# Patient Record
Sex: MALE | Race: WHITE | Employment: OTHER | URBAN - METROPOLITAN AREA
[De-identification: names, ages, dates, MRNs, and addresses within clinical notes are randomized per-mention and may not be internally consistent; named-entity substitution may affect disease eponyms.]

---

## 2022-09-22 ENCOUNTER — HOSPITAL ENCOUNTER (INPATIENT)
Age: 67
LOS: 6 days | Discharge: HOME | DRG: 869 | End: 2022-09-28
Attending: EMERGENCY MEDICINE | Admitting: INTERNAL MEDICINE
Payer: MEDICARE

## 2022-09-22 DIAGNOSIS — R06.02 SOB (SHORTNESS OF BREATH): Primary | ICD-10-CM

## 2022-09-22 DIAGNOSIS — R91.8 ABNORMAL CT LUNG SCREENING: ICD-10-CM

## 2022-09-22 LAB
ALBUMIN SERPL-MCNC: 3.4 G/DL (ref 3.4–5)
ALBUMIN/GLOB SERPL: 0.7 {RATIO} (ref 0.8–1.7)
ALP SERPL-CCNC: 113 U/L (ref 45–117)
ALT SERPL-CCNC: 28 U/L (ref 16–61)
ANION GAP SERPL CALC-SCNC: 5 MMOL/L (ref 3–18)
ARTERIAL PATENCY WRIST A: ABNORMAL
AST SERPL-CCNC: 25 U/L (ref 10–38)
BASE EXCESS BLD CALC-SCNC: 0.7 MMOL/L
BASOPHILS # BLD: 0.1 K/UL (ref 0–0.1)
BASOPHILS NFR BLD: 1 % (ref 0–2)
BDY SITE: ABNORMAL
BILIRUB SERPL-MCNC: 0.5 MG/DL (ref 0.2–1)
BUN SERPL-MCNC: 12 MG/DL (ref 7–18)
BUN/CREAT SERPL: 13 (ref 12–20)
CALCIUM SERPL-MCNC: 9.4 MG/DL (ref 8.5–10.1)
CHLORIDE SERPL-SCNC: 104 MMOL/L (ref 100–111)
CO2 SERPL-SCNC: 27 MMOL/L (ref 21–32)
CREAT SERPL-MCNC: 0.89 MG/DL (ref 0.6–1.3)
DIFFERENTIAL METHOD BLD: ABNORMAL
EOSINOPHIL # BLD: 0.6 K/UL (ref 0–0.4)
EOSINOPHIL NFR BLD: 9 % (ref 0–5)
ERYTHROCYTE [DISTWIDTH] IN BLOOD BY AUTOMATED COUNT: 14.3 % (ref 11.6–14.5)
ERYTHROCYTE [SEDIMENTATION RATE] IN BLOOD: 46 MM/HR (ref 0–20)
GAS FLOW.O2 O2 DELIVERY SYS: ABNORMAL L/MIN
GLOBULIN SER CALC-MCNC: 4.8 G/DL (ref 2–4)
GLUCOSE SERPL-MCNC: 117 MG/DL (ref 74–99)
HCO3 BLD-SCNC: 25.1 MMOL/L (ref 22–26)
HCT VFR BLD AUTO: 42.8 % (ref 36–48)
HGB BLD-MCNC: 13.5 G/DL (ref 13–16)
IMM GRANULOCYTES # BLD AUTO: 0 K/UL (ref 0–0.04)
IMM GRANULOCYTES NFR BLD AUTO: 1 % (ref 0–0.5)
INR PPP: 2.3 (ref 0.8–1.2)
LACTATE BLD-SCNC: 1.33 MMOL/L (ref 0.4–2)
LYMPHOCYTES # BLD: 1.4 K/UL (ref 0.9–3.6)
LYMPHOCYTES NFR BLD: 21 % (ref 21–52)
MCH RBC QN AUTO: 27.1 PG (ref 24–34)
MCHC RBC AUTO-ENTMCNC: 31.5 G/DL (ref 31–37)
MCV RBC AUTO: 85.8 FL (ref 78–100)
MONOCYTES # BLD: 0.5 K/UL (ref 0.05–1.2)
MONOCYTES NFR BLD: 8 % (ref 3–10)
NEUTS SEG # BLD: 4.2 K/UL (ref 1.8–8)
NEUTS SEG NFR BLD: 61 % (ref 40–73)
NRBC # BLD: 0 K/UL (ref 0–0.01)
NRBC BLD-RTO: 0 PER 100 WBC
O2/TOTAL GAS SETTING VFR VENT: 21 %
PCO2 BLD: 38.7 MMHG (ref 35–45)
PH BLD: 7.42 [PH] (ref 7.35–7.45)
PLATELET # BLD AUTO: 215 K/UL (ref 135–420)
PMV BLD AUTO: 9.7 FL (ref 9.2–11.8)
PO2 BLD: 75 MMHG (ref 80–100)
POTASSIUM SERPL-SCNC: 4.3 MMOL/L (ref 3.5–5.5)
PROT SERPL-MCNC: 8.2 G/DL (ref 6.4–8.2)
PROTHROMBIN TIME: 25.3 SEC (ref 11.5–15.2)
RBC # BLD AUTO: 4.99 M/UL (ref 4.35–5.65)
SAO2 % BLD: 95.2 % (ref 92–97)
SERVICE CMNT-IMP: ABNORMAL
SODIUM SERPL-SCNC: 136 MMOL/L (ref 136–145)
SPECIMEN TYPE: ABNORMAL
TOTAL RESP. RATE, ITRR: 18
TROPONIN-HIGH SENSITIVITY: 7 NG/L (ref 0–78)
TSH SERPL DL<=0.05 MIU/L-ACNC: 1.29 UIU/ML (ref 0.36–3.74)
WBC # BLD AUTO: 6.9 K/UL (ref 4.6–13.2)

## 2022-09-22 PROCEDURE — 82803 BLOOD GASES ANY COMBINATION: CPT

## 2022-09-22 PROCEDURE — 99285 EMERGENCY DEPT VISIT HI MDM: CPT

## 2022-09-22 PROCEDURE — 65270000029 HC RM PRIVATE

## 2022-09-22 PROCEDURE — 83605 ASSAY OF LACTIC ACID: CPT

## 2022-09-22 PROCEDURE — 85025 COMPLETE CBC W/AUTO DIFF WBC: CPT

## 2022-09-22 PROCEDURE — 84484 ASSAY OF TROPONIN QUANT: CPT

## 2022-09-22 PROCEDURE — 84443 ASSAY THYROID STIM HORMONE: CPT

## 2022-09-22 PROCEDURE — 80053 COMPREHEN METABOLIC PANEL: CPT

## 2022-09-22 PROCEDURE — 85610 PROTHROMBIN TIME: CPT

## 2022-09-22 PROCEDURE — 86140 C-REACTIVE PROTEIN: CPT

## 2022-09-22 PROCEDURE — 85652 RBC SED RATE AUTOMATED: CPT

## 2022-09-22 PROCEDURE — 87040 BLOOD CULTURE FOR BACTERIA: CPT

## 2022-09-22 PROCEDURE — 36600 WITHDRAWAL OF ARTERIAL BLOOD: CPT

## 2022-09-22 NOTE — ED PROVIDER NOTES
EMERGENCY DEPARTMENT HISTORY AND PHYSICAL EXAM    Date: 9/22/2022  Patient Name: Thomas Perkins    History of Presenting Illness     Chief Complaint   Patient presents with    Shortness of Breath       History Provided By: Patient and Dr. Jeanne Saldana      History Cleotis Valdez):   2:51 PM  Thomas Perkins is a 75 y.o. male with a PMHX of international travel to nonindustrialized nations  who presents to the emergency department (room 11) from pulmonary clinic C/O shortness of breath onset several months ago. Associated sxs include dyspnea and history of relapsing remitting fevers. Pt denies any other sxs or complaints. The patient was seen in the pulmonology clinic for shortness of breath which has been worsening over the last several months. He was last in the [facility] in March. From March through May he had relapsing and remitting fevers every couple of hours. He states he has been tested for malaria. He also had a CT 2 days ago which showed new miliary lesions bilaterally. Patient followed up with pulmonology, Dr. Jeanne Saldana today who referred the patient to the ED. Chief Complaint: Shortness of breath  Onset: Several months  Timing:  Subacute  Context:  International travel brought symptoms on, symptoms have not improved since onset  Location: Lungs  Quality:  Short of air  Severity: Moderate  Modifying Factors: Nothing makes it better, or worse. Associated Symptoms:  History of relaxing remitting fevers    PCP: Modesta Torres MD     Past History         Past Medical History:  History reviewed. No pertinent past medical history. Past Surgical History:  History reviewed. No pertinent surgical history. Family History:  History reviewed. No pertinent family history. Reviewed and non-contributory    Social History:  Social History     Tobacco Use    Smoking status: Never    Smokeless tobacco: Never       Medications:       Allergies:  No Known Allergies    Review of Systems      Review of Systems Constitutional:  Positive for fever. Negative for chills. HENT:  Negative for rhinorrhea and sore throat. Eyes:  Negative for pain and visual disturbance. Respiratory:  Positive for shortness of breath. Negative for chest tightness and wheezing. Cardiovascular:  Negative for chest pain and palpitations. Gastrointestinal:  Negative for abdominal pain, diarrhea, nausea and vomiting. Musculoskeletal:  Negative for arthralgias and myalgias. Skin:  Negative for rash and wound. Neurological:  Negative for speech difficulty, light-headedness and headaches. Psychiatric/Behavioral:  Negative for agitation and confusion. All other systems reviewed and are negative. Physical Exam     Vitals:    09/22/22 1432 09/22/22 1540 09/22/22 1633 09/22/22 2004   BP: 112/70 133/76 109/60    Pulse:  91 86    Resp:  18 20    Temp:       SpO2: 97% 96% 97% 97%   Weight: 113.4 kg (250 lb)          Physical Exam  Vitals and nursing note reviewed. Constitutional:       General: He is not in acute distress. Appearance: Normal appearance. He is normal weight. He is not ill-appearing. HENT:      Head: Normocephalic and atraumatic. Nose: Nose normal. No rhinorrhea. Mouth/Throat:      Mouth: Mucous membranes are moist.      Pharynx: No oropharyngeal exudate or posterior oropharyngeal erythema. Eyes:      Extraocular Movements: Extraocular movements intact. Conjunctiva/sclera: Conjunctivae normal.      Pupils: Pupils are equal, round, and reactive to light. Cardiovascular:      Rate and Rhythm: Normal rate and regular rhythm. Heart sounds: No murmur heard. No friction rub. No gallop. Pulmonary:      Effort: Pulmonary effort is normal. No respiratory distress. Breath sounds: Normal breath sounds. No wheezing, rhonchi or rales. Abdominal:      General: Bowel sounds are normal.      Palpations: Abdomen is soft. Tenderness: There is no abdominal tenderness.  There is no guarding or rebound. Musculoskeletal:         General: No swelling, tenderness or deformity. Normal range of motion. Cervical back: Normal range of motion and neck supple. No rigidity. Lymphadenopathy:      Cervical: No cervical adenopathy. Skin:     General: Skin is warm and dry. Findings: No rash. Neurological:      General: No focal deficit present. Mental Status: He is alert and oriented to person, place, and time. Psychiatric:         Mood and Affect: Mood normal.         Behavior: Behavior normal.       Diagnostic Study Results     Labs -  Recent Results (from the past 12 hour(s))   POC LACTIC ACID    Collection Time: 09/22/22  2:39 PM   Result Value Ref Range    Lactic Acid (POC) 1.33 0.40 - 2.00 mmol/L   CBC WITH AUTOMATED DIFF    Collection Time: 09/22/22  2:45 PM   Result Value Ref Range    WBC 6.9 4.6 - 13.2 K/uL    RBC 4.99 4.35 - 5.65 M/uL    HGB 13.5 13.0 - 16.0 g/dL    HCT 42.8 36.0 - 48.0 %    MCV 85.8 78.0 - 100.0 FL    MCH 27.1 24.0 - 34.0 PG    MCHC 31.5 31.0 - 37.0 g/dL    RDW 14.3 11.6 - 14.5 %    PLATELET 182 329 - 341 K/uL    MPV 9.7 9.2 - 11.8 FL    NRBC 0.0 0  WBC    ABSOLUTE NRBC 0.00 0.00 - 0.01 K/uL    NEUTROPHILS 61 40 - 73 %    LYMPHOCYTES 21 21 - 52 %    MONOCYTES 8 3 - 10 %    EOSINOPHILS 9 (H) 0 - 5 %    BASOPHILS 1 0 - 2 %    IMMATURE GRANULOCYTES 1 (H) 0.0 - 0.5 %    ABS. NEUTROPHILS 4.2 1.8 - 8.0 K/UL    ABS. LYMPHOCYTES 1.4 0.9 - 3.6 K/UL    ABS. MONOCYTES 0.5 0.05 - 1.2 K/UL    ABS. EOSINOPHILS 0.6 (H) 0.0 - 0.4 K/UL    ABS. BASOPHILS 0.1 0.0 - 0.1 K/UL    ABS. IMM.  GRANS. 0.0 0.00 - 0.04 K/UL    DF AUTOMATED     METABOLIC PANEL, COMPREHENSIVE    Collection Time: 09/22/22  2:45 PM   Result Value Ref Range    Sodium 136 136 - 145 mmol/L    Potassium 4.3 3.5 - 5.5 mmol/L    Chloride 104 100 - 111 mmol/L    CO2 27 21 - 32 mmol/L    Anion gap 5 3.0 - 18 mmol/L    Glucose 117 (H) 74 - 99 mg/dL    BUN 12 7.0 - 18 MG/DL    Creatinine 0.89 0.6 - 1.3 MG/DL BUN/Creatinine ratio 13 12 - 20      GFR est AA >60 >60 ml/min/1.73m2    GFR est non-AA >60 >60 ml/min/1.73m2    Calcium 9.4 8.5 - 10.1 MG/DL    Bilirubin, total 0.5 0.2 - 1.0 MG/DL    ALT (SGPT) 28 16 - 61 U/L    AST (SGOT) 25 10 - 38 U/L    Alk. phosphatase 113 45 - 117 U/L    Protein, total 8.2 6.4 - 8.2 g/dL    Albumin 3.4 3.4 - 5.0 g/dL    Globulin 4.8 (H) 2.0 - 4.0 g/dL    A-G Ratio 0.7 (L) 0.8 - 1.7     TSH 3RD GENERATION    Collection Time: 09/22/22  2:45 PM   Result Value Ref Range    TSH 1.29 0.36 - 3.74 uIU/mL   SED RATE (ESR)    Collection Time: 09/22/22  2:45 PM   Result Value Ref Range    Sed rate, automated 46 (H) 0 - 20 mm/hr   TROPONIN-HIGH SENSITIVITY    Collection Time: 09/22/22  2:45 PM   Result Value Ref Range    Troponin-High Sensitivity 7 0 - 78 ng/L   PROTHROMBIN TIME + INR    Collection Time: 09/22/22  2:45 PM   Result Value Ref Range    Prothrombin time 25.3 (H) 11.5 - 15.2 sec    INR 2.3 (H) 0.8 - 1.2     BLOOD GAS, ARTERIAL POC    Collection Time: 09/22/22  9:51 PM   Result Value Ref Range    Device: ROOM AIR      FIO2 (POC) 21 %    pH (POC) 7.42 7.35 - 7.45      pCO2 (POC) 38.7 35.0 - 45.0 MMHG    pO2 (POC) 75 (L) 80 - 100 MMHG    HCO3 (POC) 25.1 22 - 26 MMOL/L    sO2 (POC) 95.2 92 - 97 %    Base excess (POC) 0.7 mmol/L    Allens test (POC) NOT APPLICABLE      Total resp. rate 18      Site RIGHT RADIAL      Specimen type (POC) ARTERIAL      Performed by Thania Jones        Radiologic Studies -   CTA CHEST ABD PELV W CONT    (Results Pending)     CT Results  (Last 48 hours)      None          CXR Results  (Last 48 hours)      None            Medications given in the ED-  Medications   Warfarin - Pharmacy to Dose (has no administration in time range)       Procedures     Procedures    ED Course     I Lexi Lin MD) am the first provider for this patient.   I reviewed the vital signs, available nursing notes, past medical history, past surgical history, family history and social history. Records Reviewed: Nursing Notes    Cardiac Monitor:  Rate: 86 bpm  Rhythm: sinus rhythm    Pulse Oximetry Analysis - 97% on RA    2:51 PM Initial assessment performed. The patients presenting problems have been discussed, and they are in agreement with the care plan formulated and outlined with them. I have encouraged them to ask questions as they arise throughout their visit. ED Course as of 09/22/22 2309   Thu Sep 22, 2022   1737 Discussed with Dr. Jami Jain, admit to medicine. Sputum for acid-fast bacteria, QuantiFERON gold [JM]   1836 Discussed with Dr. Tarik East for medical admission. [JM]      ED Course User Index  [JM] Rebekah Lala MD         Medical Decision Making     Provider Notes (Medical Decision Making):   DDX: Tuberculosis, malaria, other tropical diseases, Q fever, dengue fever, equine encephalitis, malignancy    Discussion:  75 y.o. male with months of shortness of breath symptoms that is being investigated by pulmonology he had an abnormal CT scan 2 days ago and was following up with the pulmonology clinic today. They recommended he come to the ED for further work-up. Patient will be admitted to the hospitalist with pulmonology consult. Consideration of ID consults if there is any treatable diagnosis discovered. Patient understands and agrees with this plan. Diagnosis and Disposition     Critical Care Time: 0 minutes    Core Measures:  For Hospitalized Patients:    1. Hospitalization Decision Time:  The decision to hospitalize the patient was made by Pam Cruz MD, MD at 2:51 PM on 9/22/2022    2. Aspirin: Aspirin was not given because the patient did not present with a stroke at the time of their Emergency Department evaluation    6:36 PM Patient is being admitted to the hospital by Dr. Tarik East. The results of their tests and reasons for their admission have been discussed with them and/or available family.  They convey agreement and understanding for the need to be admitted and for their admission diagnosis. CONDITIONS ON ADMISSION:  Sepsis is not present at the time of admission. Deep Vein Thrombosis is not present at the time of admission. Thrombosis is not present at the time of admission. Urinary Tract Infection is not present at the time of admission. Pneumonia is not present at the time of admission. MRSA is not present at the time of admission. Wound infection is not present at the time of admission. Pressure Ulcer is not present at the time of admission. CLINICAL IMPRESSION:    1. SOB (shortness of breath)    2. Abnormal CT lung screening      I Yue Ribeiro MD am the primary clinician of record. Dragon Disclaimer     Please note that this dictation was completed with [employer], the computer voice recognition software. Quite often unanticipated grammatical, syntax, homophones, and other interpretive errors are inadvertently transcribed by the computer software. Please disregard these errors. Please excuse any errors that have escaped final proofreading.     Yue Ribeiro MD

## 2022-09-22 NOTE — H&P
History & Physical    Patient: Asiya Hernandez MRN: [id number]  CSN: [id number]    YOB: 1955  Age: 75 y.o. Sex: male      DOA: 9/22/2022    Chief Complaint:   Chief Complaint   Patient presents with    Shortness of Breath          HPI:     Asiya Hernandez is a 75 y.o.  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 He has been feeling ill with cough shortness of breath intermittently fevers since discharge OT [facility] in March he has undergone several rounds of antibiotics and finally no longer has fevers but notes persistent shortness of breath. Patient also required oxygen and recently was able to come off of this. Within the last few weeks. However he notes he has about 40 pound weight loss recurrent sweats dyspnea with minimal exertion. He has a loop recorder placed for cardiac work-up. He has had several GI scans some which have showed splenomegaly and most of his labs show a persistent eosinophilia. He has lost about 40 pounds unintentionally since he is traveled to [facility] none of his other companions to travel have been ill or had TB  He lives with his wife who has no respiratory symptoms. He would like to go to. Patient has a home Coumadin machine which keeps his INR right about 2.5  He has been on this for several years for PE. 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 Past Medical History:   Diagnosis Date    COVID     Hypoxemia     Pulmonary embolism ([address]        History reviewed. No pertinent surgical history. History reviewed. No pertinent family history.     Social History     Socioeconomic History    Marital status:    Tobacco Use    Smoking status: Never    Smokeless tobacco: Never       Prior to Admission medications    Medication Sig Start Date End Date Taking? Authorizing Provider   warfarin (COUMADIN) 4 mg tablet Take 4 mg by mouth every other day. Yes Provider, Historical   warfarin (COUMADIN) 5 mg tablet Take 5 mg by mouth every other day. Yes Provider, Historical       No Known Allergies      Review of Systems  GENERAL: Patient alert, awake and oriented times 3, able to communicate full sentences and not in distress. HEENT: No change in vision, no earache, tinnitus, sore throat or sinus congestion. NECK: No pain or stiffness. PULMONARY: +shortness of breath, cough or wheeze. Cardiovascular: no pnd or orthopnea, no CP  GASTROINTESTINAL: No abdominal pain, nausea, vomiting or diarrhea, melena or bright red blood per rectum. Splenomegaly  GENITOURINARY: No urinary frequency, urgency, hesitancy or dysuria. MUSCULOSKELETAL: No joint or muscle pain, no back pain, no recent trauma. DERMATOLOGIC: No rash, no itching, no lesions. Keratosis  ENDOCRINE: No polyuria, polydipsia, no heat or cold intolerance. No recent change in weight. HEMATOLOGICAL: No anemia +easy bruising or bleeding. NEUROLOGIC: No headache, seizures, numbness, tingling + weakness. Physical Exam:     Physical Exam:  Visit Vitals  /60   Pulse 86   Temp 98.7 °F (37.1 °C)   Resp 20   Wt 113.4 kg (250 lb)   SpO2 97%      O2 Device: None (Room air)    Temp (24hrs), Av.7 °F (37.1 °C), Min:98.7 °F (37.1 °C), Max:98.7 °F (37.1 °C)    No intake/output data recorded. No intake/output data recorded. General:  Alert, cooperative, no distress, appears stated age. Appears slightly dyspneic when talking              Head: Normocephalic, without obvious abnormality, atraumatic. Eyes:  Conjunctivae/corneas clear, EOMs intact. Nose: Nares normal. No drainage    Neck: Supple, symmetrical, trachea midline, no adenopathy, thyroid: no enlargement, no carotid bruit and no JVD. Lungs:   Clear to auscultation bilaterally.   Diminished breath sounds throughout cough with inspiratory inhalation occasional wheeze   Heart:  Regular rate and rhythm, S1, S2 normal.     Abdomen: Soft, non-tender. Bowel sounds normal.    Extremities: Extremities normal, atraumatic, no cyanosis or edema. Pulses: 2+ and symmetric all extremities. Skin:  No rashes or lesions   Neurologic: AAOx3, No focal motor or sensory deficit. Labs Reviewed:  Recent Results (from the past 12 hour(s))   POC LACTIC ACID    Collection Time: 09/22/22  2:39 PM   Result Value Ref Range    Lactic Acid (POC) 1.33 0.40 - 2.00 mmol/L   CBC WITH AUTOMATED DIFF    Collection Time: 09/22/22  2:45 PM   Result Value Ref Range    WBC 6.9 4.6 - 13.2 K/uL    RBC 4.99 4.35 - 5.65 M/uL    HGB 13.5 13.0 - 16.0 g/dL    HCT 42.8 36.0 - 48.0 %    MCV 85.8 78.0 - 100.0 FL    MCH 27.1 24.0 - 34.0 PG    MCHC 31.5 31.0 - 37.0 g/dL    RDW 14.3 11.6 - 14.5 %    PLATELET 105 461 - 213 K/uL    MPV 9.7 9.2 - 11.8 FL    NRBC 0.0 0  WBC    ABSOLUTE NRBC 0.00 0.00 - 0.01 K/uL    NEUTROPHILS 61 40 - 73 %    LYMPHOCYTES 21 21 - 52 %    MONOCYTES 8 3 - 10 %    EOSINOPHILS 9 (H) 0 - 5 %    BASOPHILS 1 0 - 2 %    IMMATURE GRANULOCYTES 1 (H) 0.0 - 0.5 %    ABS. NEUTROPHILS 4.2 1.8 - 8.0 K/UL    ABS. LYMPHOCYTES 1.4 0.9 - 3.6 K/UL    ABS. MONOCYTES 0.5 0.05 - 1.2 K/UL    ABS. EOSINOPHILS 0.6 (H) 0.0 - 0.4 K/UL    ABS. BASOPHILS 0.1 0.0 - 0.1 K/UL    ABS. IMM.  GRANS. 0.0 0.00 - 0.04 K/UL    DF AUTOMATED     METABOLIC PANEL, COMPREHENSIVE    Collection Time: 09/22/22  2:45 PM   Result Value Ref Range    Sodium 136 136 - 145 mmol/L    Potassium 4.3 3.5 - 5.5 mmol/L    Chloride 104 100 - 111 mmol/L    CO2 27 21 - 32 mmol/L    Anion gap 5 3.0 - 18 mmol/L    Glucose 117 (H) 74 - 99 mg/dL    BUN 12 7.0 - 18 MG/DL    Creatinine 0.89 0.6 - 1.3 MG/DL    BUN/Creatinine ratio 13 12 - 20      GFR est AA >60 >60 ml/min/1.73m2    GFR est non-AA >60 >60 ml/min/1.73m2    Calcium 9.4 8.5 - 10.1 MG/DL    Bilirubin, total 0.5 0.2 - 1.0 MG/DL    ALT (SGPT) 28 16 - 61 U/L    AST (SGOT) 25 10 - 38 U/L    Alk. phosphatase 113 45 - 117 U/L    Protein, total 8.2 6.4 - 8.2 g/dL    Albumin 3.4 3.4 - 5.0 g/dL    Globulin 4.8 (H) 2.0 - 4.0 g/dL    A-G Ratio 0.7 (L) 0.8 - 1.7     TSH 3RD GENERATION    Collection Time: 09/22/22  2:45 PM   Result Value Ref Range    TSH 1.29 0.36 - 3.74 uIU/mL   SED RATE (ESR)    Collection Time: 09/22/22  2:45 PM   Result Value Ref Range    Sed rate, automated 46 (H) 0 - 20 mm/hr   TROPONIN-HIGH SENSITIVITY    Collection Time: 09/22/22  2:45 PM   Result Value Ref Range    Troponin-High Sensitivity 7 0 - 78 ng/L   PROTHROMBIN TIME + INR    Collection Time: 09/22/22  2:45 PM   Result Value Ref Range    Prothrombin time 25.3 (H) 11.5 - 15.2 sec    INR 2.3 (H) 0.8 - 1.2     BLOOD GAS, ARTERIAL POC    Collection Time: 09/22/22  9:51 PM   Result Value Ref Range    Device: ROOM AIR      FIO2 (POC) 21 %    pH (POC) 7.42 7.35 - 7.45      pCO2 (POC) 38.7 35.0 - 45.0 MMHG    pO2 (POC) 75 (L) 80 - 100 MMHG    HCO3 (POC) 25.1 22 - 26 MMOL/L    sO2 (POC) 95.2 92 - 97 %    Base excess (POC) 0.7 mmol/L    Allens test (POC) NOT APPLICABLE      Total resp. rate 18      Site RIGHT RADIAL      Specimen type (POC) ARTERIAL      Performed by Vikash Granado         Coumadin and Protonix   and EKG    Procedures/imaging: see electronic medical records for all procedures/Xrays and details which were not copied into this note but were reviewed prior to creation of Plan      Assessment/Plan     Active Problems:  1. 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 Unintentional weight loss  3. History of PE on Coumadin  4. Eosinophilia  5. Pulmonary nodules  6.   Splenomegaly    Plan:  I called several consults  QuantiFERON gold and AFB smears x3 were ordered  Ordered several labs indicating sed rate CRP HIV hepatitis please see orders for detail  We will check an echocardiogram with bubble he already has a loop recorder  Discussed with ID hold antibiotics for now no emergent indication be done several outpatient for status  I did add albuterol inhaler I feel like he would benefit from steroids but will defer to pulmonary  I reviewed care everywhere there has been no recent CTs with contrast  I did recommend having CT with contrast looking for malignancy cirrhosis and other areas of nodules or dissemination  We will also check an ABG  We will have pharmacy dose his Coumadin    DVT/GI Prophylaxis: Lovenox and Coumadin        Gilberto Cortez MD  9/23/2022 6:35 PM

## 2022-09-22 NOTE — CONSULTS
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    CT chest 9/20/22:   1. Innumerable pulmonary nodules bilaterally highly suspicious for atypical infection to include TB. Please see discussion above. CBC w/diff, cmp reviewed  He is not hypoxic or febrile    Suggest:  Airborne isolation  AFB sputum X3-- needed, early morning sputum should be at least one of them  Order for Cryptococcal/ dengue/B.pertusisis/ Dengue fever/ HIV serology and hepatitis panel == noted    Will see him in am    No need for emperic ABX in this patient. He doesn's look septic/toxic from chart review, can process work up first.  Thanks    Bia Gomes MD  [facility])  Office #:     [phone] [phone]   Office Fax: [phone]

## 2022-09-23 ENCOUNTER — APPOINTMENT (OUTPATIENT)
Dept: CT IMAGING | Age: 67
DRG: 869 | End: 2022-09-23
Attending: INTERNAL MEDICINE
Payer: MEDICARE

## 2022-09-23 ENCOUNTER — APPOINTMENT (OUTPATIENT)
Dept: NON INVASIVE DIAGNOSTICS | Age: 67
DRG: 869 | End: 2022-09-23
Attending: INTERNAL MEDICINE
Payer: MEDICARE

## 2022-09-23 ENCOUNTER — APPOINTMENT (OUTPATIENT)
Dept: GENERAL RADIOLOGY | Age: 67
DRG: 869 | End: 2022-09-23
Attending: INTERNAL MEDICINE
Payer: MEDICARE

## 2022-09-23 PROBLEM — R59.0 HILAR ADENOPATHY: Status: ACTIVE | Noted: 2022-09-23

## 2022-09-23 PROBLEM — R05.9 COUGH: Status: ACTIVE | Noted: 2022-09-23

## 2022-09-23 LAB
ALBUMIN SERPL-MCNC: 3.1 G/DL (ref 3.4–5)
ALBUMIN/GLOB SERPL: 0.7 {RATIO} (ref 0.8–1.7)
ALP SERPL-CCNC: 102 U/L (ref 45–117)
ALT SERPL-CCNC: 26 U/L (ref 16–61)
ANION GAP SERPL CALC-SCNC: 2 MMOL/L (ref 3–18)
AST SERPL-CCNC: 19 U/L (ref 10–38)
BASOPHILS # BLD: 0 K/UL (ref 0–0.1)
BASOPHILS NFR BLD: 1 % (ref 0–2)
BILIRUB SERPL-MCNC: 0.5 MG/DL (ref 0.2–1)
BUN SERPL-MCNC: 13 MG/DL (ref 7–18)
BUN/CREAT SERPL: 14 (ref 12–20)
CALCIUM SERPL-MCNC: 9.4 MG/DL (ref 8.5–10.1)
CHLORIDE SERPL-SCNC: 104 MMOL/L (ref 100–111)
CO2 SERPL-SCNC: 30 MMOL/L (ref 21–32)
CREAT SERPL-MCNC: 0.9 MG/DL (ref 0.6–1.3)
CRP SERPL-MCNC: 5.2 MG/DL (ref 0–0.3)
CRP SERPL-MCNC: 5.7 MG/DL (ref 0–0.3)
CRYPTOC AG SER QL IA: NEGATIVE
DIFFERENTIAL METHOD BLD: ABNORMAL
ECHO AO ROOT DIAM: 3.2 CM
ECHO AO ROOT INDEX: 1.44 CM/M2
ECHO AV AREA PEAK VELOCITY: 2.1 CM2
ECHO AV AREA VTI: 2.4 CM2
ECHO AV AREA/BSA PEAK VELOCITY: 0.9 CM2/M2
ECHO AV AREA/BSA VTI: 1.1 CM2/M2
ECHO AV MEAN GRADIENT: 7 MMHG
ECHO AV MEAN VELOCITY: 1.3 M/S
ECHO AV PEAK GRADIENT: 14 MMHG
ECHO AV PEAK VELOCITY: 1.9 M/S
ECHO AV VELOCITY RATIO: 0.47
ECHO AV VTI: 34.4 CM
ECHO EST RA PRESSURE: 3 MMHG
ECHO LA DIAMETER INDEX: 1.53 CM/M2
ECHO LA DIAMETER: 3.4 CM
ECHO LA TO AORTIC ROOT RATIO: 1.06
ECHO LA VOL 2C: 33 ML (ref 18–58)
ECHO LA VOL 4C: 36 ML (ref 18–58)
ECHO LA VOL BP: 36 ML (ref 18–58)
ECHO LA VOL/BSA BIPLANE: 16 ML/M2 (ref 16–34)
ECHO LA VOLUME AREA LENGTH: 39 ML
ECHO LA VOLUME INDEX A2C: 15 ML/M2 (ref 16–34)
ECHO LA VOLUME INDEX A4C: 16 ML/M2 (ref 16–34)
ECHO LA VOLUME INDEX AREA LENGTH: 18 ML/M2 (ref 16–34)
ECHO LV E' LATERAL VELOCITY: 10 CM/S
ECHO LV E' SEPTAL VELOCITY: 7 CM/S
ECHO LV EDV A2C: 43 ML
ECHO LV EDV A4C: 78 ML
ECHO LV EDV BP: 58 ML (ref 67–155)
ECHO LV EDV INDEX A4C: 35 ML/M2
ECHO LV EDV INDEX BP: 26 ML/M2
ECHO LV EDV NDEX A2C: 19 ML/M2
ECHO LV EJECTION FRACTION A2C: 59 %
ECHO LV EJECTION FRACTION A4C: 56 %
ECHO LV EJECTION FRACTION BIPLANE: 56 % (ref 55–100)
ECHO LV ESV A2C: 18 ML
ECHO LV ESV A4C: 34 ML
ECHO LV ESV BP: 25 ML (ref 22–58)
ECHO LV ESV INDEX A2C: 8 ML/M2
ECHO LV ESV INDEX A4C: 15 ML/M2
ECHO LV ESV INDEX BP: 11 ML/M2
ECHO LV FRACTIONAL SHORTENING: 33 % (ref 28–44)
ECHO LV GLOBAL LONGITUDINAL STRAIN (GLS): -10.9 %
ECHO LV GLOBAL LONGITUDINAL STRAIN (GLS): -12.3 %
ECHO LV GLOBAL LONGITUDINAL STRAIN (GLS): -13.1 %
ECHO LV GLOBAL LONGITUDINAL STRAIN (GLS): -15.9 %
ECHO LV INTERNAL DIMENSION DIASTOLE INDEX: 1.8 CM/M2
ECHO LV INTERNAL DIMENSION DIASTOLIC: 4 CM (ref 4.2–5.9)
ECHO LV INTERNAL DIMENSION SYSTOLIC INDEX: 1.22 CM/M2
ECHO LV INTERNAL DIMENSION SYSTOLIC: 2.7 CM
ECHO LV IVSD: 1.3 CM (ref 0.6–1)
ECHO LV MASS 2D: 145.6 G (ref 88–224)
ECHO LV MASS INDEX 2D: 65.6 G/M2 (ref 49–115)
ECHO LV POSTERIOR WALL DIASTOLIC: 0.9 CM (ref 0.6–1)
ECHO LV RELATIVE WALL THICKNESS RATIO: 0.45
ECHO LVOT AREA: 4.5 CM2
ECHO LVOT AV VTI INDEX: 0.53
ECHO LVOT DIAM: 2.4 CM
ECHO LVOT MEAN GRADIENT: 2 MMHG
ECHO LVOT PEAK GRADIENT: 3 MMHG
ECHO LVOT PEAK VELOCITY: 0.9 M/S
ECHO LVOT STROKE VOLUME INDEX: 36.9 ML/M2
ECHO LVOT SV: 81.8 ML
ECHO LVOT VTI: 18.1 CM
ECHO MV A VELOCITY: 0.65 M/S
ECHO MV E VELOCITY: 0.57 M/S
ECHO MV E/A RATIO: 0.88
ECHO MV E/E' LATERAL: 5.7
ECHO MV E/E' RATIO (AVERAGED): 6.92
ECHO MV E/E' SEPTAL: 8.14
ECHO PULMONARY ARTERY SYSTOLIC PRESSURE (PASP): 26 MMHG
ECHO PV ACCELERATION TIME (AT): 62.8 MS
ECHO PV MAX VELOCITY: 1.1 M/S
ECHO PV MEAN GRADIENT: 2 MMHG
ECHO PV MEAN VELOCITY: 0.7 M/S
ECHO PV PEAK GRADIENT: 5 MMHG
ECHO RIGHT VENTRICULAR SYSTOLIC PRESSURE (RVSP): 26 MMHG
ECHO RV FREE WALL PEAK S': 12 CM/S
ECHO RV INTERNAL DIMENSION: 3.2 CM
ECHO TV REGURGITANT MAX VELOCITY: 2.38 M/S
ECHO TV REGURGITANT PEAK GRADIENT: 23 MMHG
EOSINOPHIL # BLD: 0.7 K/UL (ref 0–0.4)
EOSINOPHIL NFR BLD: 12 % (ref 0–5)
ERYTHROCYTE [DISTWIDTH] IN BLOOD BY AUTOMATED COUNT: 14.4 % (ref 11.6–14.5)
ERYTHROCYTE [SEDIMENTATION RATE] IN BLOOD: 61 MM/HR (ref 0–20)
GLOBULIN SER CALC-MCNC: 4.7 G/DL (ref 2–4)
GLUCOSE SERPL-MCNC: 103 MG/DL (ref 74–99)
HAV IGM SER QL: NEGATIVE
HBV CORE IGM SER QL: NEGATIVE
HBV SURFACE AG SER QL: 0.58 INDEX
HBV SURFACE AG SER QL: NEGATIVE
HCT VFR BLD AUTO: 41.2 % (ref 36–48)
HCV AB SER IA-ACNC: 0.08 INDEX
HCV AB SERPL QL IA: NEGATIVE
HCV COMMENT,HCGAC: NORMAL
HGB BLD-MCNC: 12.6 G/DL (ref 13–16)
HIV 1+2 AB+HIV1 P24 AG SERPL QL IA: NONREACTIVE
HIV12 RESULT COMMENT, HHIVC: NORMAL
IMM GRANULOCYTES # BLD AUTO: 0 K/UL (ref 0–0.04)
IMM GRANULOCYTES NFR BLD AUTO: 1 % (ref 0–0.5)
INR PPP: 2.5 (ref 0.8–1.2)
INR PPP: 2.6 (ref 0.8–1.2)
LYMPHOCYTES # BLD: 1.3 K/UL (ref 0.9–3.6)
LYMPHOCYTES NFR BLD: 22 % (ref 21–52)
MAGNESIUM SERPL-MCNC: 2 MG/DL (ref 1.6–2.6)
MCH RBC QN AUTO: 26.8 PG (ref 24–34)
MCHC RBC AUTO-ENTMCNC: 30.6 G/DL (ref 31–37)
MCV RBC AUTO: 87.7 FL (ref 78–100)
MONOCYTES # BLD: 0.4 K/UL (ref 0.05–1.2)
MONOCYTES NFR BLD: 7 % (ref 3–10)
NEUTS SEG # BLD: 3.5 K/UL (ref 1.8–8)
NEUTS SEG NFR BLD: 58 % (ref 40–73)
NRBC # BLD: 0 K/UL (ref 0–0.01)
NRBC BLD-RTO: 0 PER 100 WBC
PLATELET # BLD AUTO: 186 K/UL (ref 135–420)
PMV BLD AUTO: 9.8 FL (ref 9.2–11.8)
POTASSIUM SERPL-SCNC: 4.5 MMOL/L (ref 3.5–5.5)
PROT SERPL-MCNC: 7.8 G/DL (ref 6.4–8.2)
PROTHROMBIN TIME: 27.6 SEC (ref 11.5–15.2)
PROTHROMBIN TIME: 28.2 SEC (ref 11.5–15.2)
RBC # BLD AUTO: 4.7 M/UL (ref 4.35–5.65)
SODIUM SERPL-SCNC: 136 MMOL/L (ref 136–145)
SP1: NORMAL
SP2: NORMAL
SP3: NORMAL
TROPONIN-HIGH SENSITIVITY: 8 NG/L (ref 0–78)
WBC # BLD AUTO: 6 K/UL (ref 4.6–13.2)

## 2022-09-23 PROCEDURE — 36415 COLL VENOUS BLD VENIPUNCTURE: CPT

## 2022-09-23 PROCEDURE — 87207 SMEAR SPECIAL STAIN: CPT

## 2022-09-23 PROCEDURE — 86790 VIRUS ANTIBODY NOS: CPT

## 2022-09-23 PROCEDURE — 86140 C-REACTIVE PROTEIN: CPT

## 2022-09-23 PROCEDURE — 71275 CT ANGIOGRAPHY CHEST: CPT

## 2022-09-23 PROCEDURE — 86638 Q FEVER ANTIBODY: CPT

## 2022-09-23 PROCEDURE — 74011000636 HC RX REV CODE- 636: Performed by: INTERNAL MEDICINE

## 2022-09-23 PROCEDURE — 85610 PROTHROMBIN TIME: CPT

## 2022-09-23 PROCEDURE — 65270000029 HC RM PRIVATE

## 2022-09-23 PROCEDURE — 85025 COMPLETE CBC W/AUTO DIFF WBC: CPT

## 2022-09-23 PROCEDURE — 87327 CRYPTOCOCCUS NEOFORM AG IA: CPT

## 2022-09-23 PROCEDURE — 86480 TB TEST CELL IMMUN MEASURE: CPT

## 2022-09-23 PROCEDURE — 93306 TTE W/DOPPLER COMPLETE: CPT

## 2022-09-23 PROCEDURE — 84484 ASSAY OF TROPONIN QUANT: CPT

## 2022-09-23 PROCEDURE — 83735 ASSAY OF MAGNESIUM: CPT

## 2022-09-23 PROCEDURE — 71045 X-RAY EXAM CHEST 1 VIEW: CPT

## 2022-09-23 PROCEDURE — 86615 BORDETELLA ANTIBODY: CPT

## 2022-09-23 PROCEDURE — 74011250637 HC RX REV CODE- 250/637: Performed by: INTERNAL MEDICINE

## 2022-09-23 PROCEDURE — 85652 RBC SED RATE AUTOMATED: CPT

## 2022-09-23 PROCEDURE — 87389 HIV-1 AG W/HIV-1&-2 AB AG IA: CPT

## 2022-09-23 PROCEDURE — 80053 COMPREHEN METABOLIC PANEL: CPT

## 2022-09-23 PROCEDURE — 87116 MYCOBACTERIA CULTURE: CPT

## 2022-09-23 PROCEDURE — 83036 HEMOGLOBIN GLYCOSYLATED A1C: CPT

## 2022-09-23 PROCEDURE — 80074 ACUTE HEPATITIS PANEL: CPT

## 2022-09-23 RX ORDER — ALBUTEROL SULFATE 90 UG/1
2 AEROSOL, METERED RESPIRATORY (INHALATION)
Status: DISCONTINUED | OUTPATIENT
Start: 2022-09-23 | End: 2022-09-28 | Stop reason: HOSPADM

## 2022-09-23 RX ORDER — WARFARIN 4 MG/1
4 TABLET ORAL EVERY OTHER DAY
COMMUNITY

## 2022-09-23 RX ORDER — ALBUTEROL SULFATE 90 UG/1
2 AEROSOL, METERED RESPIRATORY (INHALATION) EVERY 4 HOURS
Status: DISCONTINUED | OUTPATIENT
Start: 2022-09-23 | End: 2022-09-28 | Stop reason: HOSPADM

## 2022-09-23 RX ORDER — WARFARIN SODIUM 5 MG/1
5 TABLET ORAL EVERY OTHER DAY
COMMUNITY

## 2022-09-23 RX ADMIN — IOPAMIDOL 100 ML: 755 INJECTION, SOLUTION INTRAVENOUS at 05:36

## 2022-09-23 RX ADMIN — WARFARIN SODIUM 4 MG: 3 TABLET ORAL at 17:08

## 2022-09-23 RX ADMIN — ALBUTEROL SULFATE 2 PUFF: 108 INHALANT RESPIRATORY (INHALATION) at 22:12

## 2022-09-23 RX ADMIN — ALBUTEROL SULFATE 2 PUFF: 108 INHALANT RESPIRATORY (INHALATION) at 17:08

## 2022-09-23 NOTE — PROGRESS NOTES
[address] [facility] care of pt at this time. Assessment complete. Pt alert and oriented x 4. Shows no sign of distress. Fall risk arm band in place. Denies SOB and chest pain. Pt lungs clear bilaterally on room air. Cap refill  less than 3 seconds. Pt denies numbness and tingling to all extremities. Stated pain 0/10. Pt has 20 G IV to R forearm. Pt has no dressing . Skin intact. On warfarin for chronic PE hx. Incentive spirometer at bedside. Pt encouraged to continue use of IS. Pt verbalized understanding. Call light and possessions within reach. Bed locked and in low position. Will continue to monitor. Shift summary  Pt is alert and oriented x 4. Pt had uneventful shift. Pt ambulating and voiding sufficient amounts . Pain to be controlled by PRN medication. Pt on contact for rule out of TB, Covid 19. Also pending labs and cultures.

## 2022-09-23 NOTE — PROGRESS NOTES
Reason for Admission:  Shortness of breath                     RUR Score:   Low; 6%                  Plan for utilizing home health:   Unlikely        PCP: First and Last name:  Donna Lujan MD     Name of Practice:    Are you a current patient: Yes/No:    Approximate date of last visit:    Can you participate in a virtual visit with your PCP:                     Current Advanced Directive/Advance Care Plan: Full Code      Healthcare Decision Maker:   Click here to complete [address] including selection of the Healthcare Decision Maker Relationship (ie \"Primary\")                             Transition of Care Plan:     Home with physician follow up                  Chart reviewed. Per H&P \"Sean Bhandari is a 75 y.o.  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 He has been feeling ill with cough shortness of breath intermittently fevers since discharge OT [facility] in March he has undergone several rounds of antibiotics and finally no longer has fevers but notes persistent shortness of breath. Patient also required oxygen and recently was able to come off of this. Within the last few weeks. However he notes he has about 40 pound weight loss recurrent sweats dyspnea with minimal exertion. He has a loop recorder placed for cardiac work-up. He has had several GI scans some which have showed splenomegaly and most of his labs show a persistent eosinophilia. He has lost about 40 pounds unintentionally since he is traveled to [facility] none of his other companions to travel have been ill or had TB  He lives with his wife who has no respiratory symptoms. He would like to go to. Patient has a home Coumadin machine which keeps his INR right about 2.5  He has been on this for several years for PE.   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 \"    CM spoke with pt to discuss care transition. Pt is  and independent. Pt does not have or use DME. Anticipate pt will transition home with physician follow up when medically stable. CM to continue to follow and assist as needed. Care Management Interventions  Mode of Transport at Discharge:  Other (see comment) (Family)  Transition of Care Consult (CM Consult): Discharge Planning  Health Maintenance Reviewed: Yes  Support Systems: Spouse/Significant Other  Confirm Follow Up Transport: Self  The Plan for Transition of Care is Related to the Following Treatment Goals : Home with physician follow up  Discharge Location  Patient Expects to be Discharged to[de-identified] Home with family assistance

## 2022-09-23 NOTE — ROUTINE PROCESS
Bedside and Verbal shift change report given to Herlinda Erwin RN (oncoming nurse) by Sienna Calix RN (offgoing nurse). Report included the following information SBAR, Kardex, MAR and Recent Results.

## 2022-09-23 NOTE — CONSULTS
[facility]  (A Division of [address])    Madison Lake MD Maegan  Office #: - Option # [phone]  Fax #: [phone]     Date of Admission: 9/22/2022      Date of Note: 9/23/2022     Reason for Referral: Evaluation and antibiotic management of BL      Thank you for involving me in the care of this patient. Please do not hesitate to contact me on the above number if question or concern. 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   --Numerous bilateral pulmonary nodules which are likely infectious or inflammatory in etiology on CT chest done 9/20/22( no mediastinal/ hilar/axillar LAP noted). Lesions have progressed since last CT 5/20/22 . High on  DDX would be Pul TB/ Miliary TB- other fungal chronic infections Vs non  infectiout etiology such as Connetive tissue ds or maliagnancy possible.   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    Recommendation -- ID related:     Interesting case-- REESE initiated for etiology  CXR --portable ordered  AFB X3, Quantiferon TB, HIV, hepatitis-pending  Infectious REESE for cryptococcal/ Q fever/ Dengue fever/ B.pertussis/Parasaite exam--sent from admission  Check cortisol level/ TSH/HB A1C for baseline/ Immunoglobulin levels  Serology for connective tissue ds- will defer to PCCM  If AFB stain X3 is negative, recommend bronchoscopy for sample- for additional work up  [facility] Department-- to facilitate early identification of RR by PCR-- they can come and get his sputums on Monday to send for [facility]--will leave instruction with patient. Will hold off Abx at this time-- call over weekend if his clinical status change. DW Dr Bijal Bernal, Dr Iram Bond and [place], and [address] [facility]. HPI:     NB: Wife was on face time for most of the interview    Shirin Bar is a 75 y.o. WHITE/NON- with PMH of obesity/ DVT/PE on Coumadin X13 years. History of prior TB exposure test negative so far. History of COVID 19 infection x2-- with travel history mostly to [place]-- last 2 years. 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 He became sick post booster dose for COVIID 19 in April 2022 per patient. He is not sure if he had infection around that time, but he was quite sick with fever to 104 , drenching sweats and later required oxygen supplement upto 5L that he was slowly weaned off- at that time, he was treated with Z-tres ans Amoxacillin as well. After that, he has persistent cough with phlegm at times. Occasional night sweats, poor appetite and weight loss X50 lb since then. At this time, he still feels easily fatigued. Denies family hx of auto-immune or connective tissue dse. Denies recurrent history of PNA/cellulitis. Sinusitis episode once every winter. Doesn't smoke, no etoh use, no drug use. Chart reviewed on prior notes  Care Everywhere reviewed- CT chest report as above  Imaging repots reviewed      Shortness of breath [R06.02]  History reviewed. No pertinent surgical history. History reviewed. No pertinent family history.   Medications reviewed as below:   Current Facility-Administered Medications   Medication Dose Route Frequency Provider Last Rate Last Admin    albuterol (PROVENTIL HFA, VENTOLIN HFA, PROAIR HFA) inhaler 2 Puff  2 Puff Inhalation Q4H PRN Marquez Torres MD        warfarin (COUMADIN) tablet 4 mg  4 mg Oral ONCE Juan Miguel Torres President MD GORDON        Warfarin - Pharmacy to Dose  1 Each Other Rx Dosing/Monitoring Diego Torres MD         No Known Allergies  Social History     Socioeconomic History    Marital status:      Spouse name: Not on file    Number of children: Not on file    Years of education: Not on file    Highest education level: Not on file   Occupational History    Not on file   Tobacco Use    Smoking status: Never    Smokeless tobacco: Never   Substance and Sexual Activity    Alcohol use: Not on file    Drug use: Not on file    Sexual activity: Not on file   Other Topics Concern    Not on file   Social History Narrative    Not on file     Social Determinants of Health     Financial Resource Strain: Not on file   Food Insecurity: Not on file   Transportation Needs: Not on file   Physical Activity: Not on file   Stress: Not on file   Social Connections: Not on file   Intimate Partner Violence: Not on file   Housing Stability: Not on file        Review of Systems    Negative Unless BOLDED    General: fevers, chills, myalgias, arthralgias, weight loss, malaise, fatigue. HEENT:  headaches,sinus pain or presure, recent URI, recent dental procedures;  tinnitus, hearing loss , visual changes, catarats, dizziness or blurred vision  PUlMONARY:  cough , shortness of breath, sputum production, hx of asthma or COPD. previous treatement for TB or PPD. Cardiovascular: chest pain, previous CAD/MI, vavlular heart disease,  murmurs  GI:   nausea, vomiting, diarrhea, abdominal pain, prior C.diff  :  urinary frequency, dysuria, hematuria, bladder incontinence.    Neurologic:  seizures, syncope or prior CVA/TIA, confusion, memory impairment, neuropathy  Musculoskeletal:  myalgias arthralgias, joint pain/ swelling,  back pain  Skin:  Purities, cellulitis,  chronic stasis ulcer, diabetic foot ulcers  Endocrine: polyuria, polydipsia, hair loss, weight gain  Psych: Denies depression or treatment by a psychiatrist/psycologist  Heme-Onc: prior DVT, easy bruising, fatigue, malignancy        Objective:      Visit Vitals  /73   Pulse 78   Temp 97.6 °F (36.4 °C)   Resp 18   Ht 5' 7\" (1.702 m)   Wt 113.4 kg (250 lb)   SpO2 96%   BMI 39.16 kg/m²     Temp (24hrs), Av.4 °F (36.9 °C), Min:97.6 °F (36.4 °C), Max:98.7 °F (37.1 °C)      Lines / Catheters:   PIV    General:   awake alert and oriented- pleasant affect   Skin:   no rashes or skin lesions noted on limited exam  Pigmented skin- left leg from DVT   HEENT:  Normocephalic, atraumatic,no scleral icterus or pallor; no conjunctival hemmohage; No thrush. Dentition good. Neck supple, no bruits. Lymph Nodes:   no cervical, axillary or inguinal adenopathy   Lungs:   non-labored, bilaterally clear to ausculation- no crackles wheezes rales or rhonchi   Heart:  RRR, s1 and s2; no murmurs rubs or gallops, no edema, + pedal pulses   Abdomen:  soft, non-distended, active bowel sounds. Appropriate surgical scars for stated surgeries. Non-tender   Genitourinary:  deferred   Extremities:   no clubbing, cyanosis; no joint effusions or swelling;  muscle mass appropriate for age   Neurologic:  No gross focal sensory abnormalities; 5/5 muscle strength to upper and lower extremities. Speech appropirate. Cranial nerves intact   Psychiatric:   appropriate and interactive.      Results       Procedure Component Value Units Date/Time    AFB CULTURE + SMEAR W/RFLX ID FROM CULTURE [[id number]]     Order Status: Sent     CRYPTOCOCCUS AG W/REFLEX TITER [[id number]] Collected: 22    Order Status: No result Updated: 22    AFB CULTURE + SMEAR W/RFLX ID FROM CULTURE [[id number]] Collected: 22    Order Status: Canceled     AFB CULTURE + SMEAR W/RFLX ID FROM CULTURE [[id number]] Collected: 2215    Order Status: Sent Updated: 22    AFB CULTURE + SMEAR W/RFLX ID FROM CULTURE [[id number]]     Order Status: Canceled     AFB CULTURE + SMEAR W/RFLX ID FROM CULTURE [[id number]]     Order Status: Canceled     RESPIRATORY VIRUS PANEL W/COVID-19, PCR [[id number]]     Order Status: Sent Specimen: NASOPHARYNGEAL SWAB     OVA & PARASITES, STOOL [[id number]]     Order Status: Canceled Specimen: Feces from Stool     ANEESH Plasencia8 [[id number]] Collected: 09/22/22 2000    Order Status: Canceled Specimen: Serum from Blood     ACID FAST SMEAR [[id number]]     Order Status: Sent     CULTURE, BLOOD [[id number]] Collected: 09/22/22 1445    Order Status: Completed Specimen: Blood Updated: 09/23/22 0832     Special Requests: NO SPECIAL REQUESTS        Culture result: NO GROWTH AFTER 17 HOURS       CULTURE, BLOOD 2nd DRAW (required for [facility]) [[id number]] Collected: 09/22/22 1445    Order Status: Completed Specimen: Blood Updated: 09/23/22 0832     Special Requests: NO SPECIAL REQUESTS        Culture result: NO GROWTH AFTER 17 HOURS               Labs: Results:   Chemistry Recent Labs     09/23/22  0147 09/22/22  1445   * 117*    136   K 4.5 4.3    104   CO2 30 27   BUN 13 12   CREA 0.90 0.89   CA 9.4 9.4   AGAP 2* 5   BUCR 14 13    113   TP 7.8 8.2   ALB 3.1* 3.4   GLOB 4.7* 4.8*   AGRAT 0.7* 0.7*      CBC w/Diff Recent Labs     09/23/22  0147 09/22/22  1445   WBC 6.0 6.9   RBC 4.70 4.99   HGB 12.6* 13.5   HCT 41.2 42.8    215   GRANS 58 61   LYMPH 22 21   EOS 12* 9*            Imaging: All imaging reviewed from Admission to present as per radiology interpretation in [address] [facility]     Total duration of time spent with patient interview and exam and discussion of plan of care, review of chart in care everywhere, discussion with medical staff- nursing/attending and additional specialities as indicated:   90 minutes.

## 2022-09-23 NOTE — PROGRESS NOTES
Assumed Pt care at AM change of shift. Assessment details in EMR. Ordered labs and cultures obtained. Gave report to Darek Faustin.

## 2022-09-23 NOTE — PROGRESS NOTES
Pharmacy Dosing Services: Warfarin Dosing    Consult for Warfarin Dosing by Pharmacy by Dr. Tessa Ayala provided for this 75 y.o.  male , for indication of Pulmonary Embolism. Day of Therapy - home med  Dose to achieve an INR goal of 2-3    Order entered for Warfarin 4 mg to be given today at 18:00. Significant drug interactions: none  PT/INR Lab Results   Component Value Date/Time    INR 2.5 (H) 09/23/2022 01:47 AM      Platelets Lab Results   Component Value Date/Time    PLATELET 545 30/35/7174 01:47 AM      H/H     Lab Results   Component Value Date/Time    HGB 12.6 (L) 09/23/2022 01:47 AM        Warfarin Administrations (last 168 hours)       None            Pharmacy to follow daily and will provide subsequent Warfarin dosing based on clinical status.   [place] information [phone]

## 2022-09-23 NOTE — CONSULTS
Pulmonary and Sleep Medicine     Pulmonary Note    Patient: Venice Rivera               Sex: male          DOA: 9/22/2022       YOB: 1955      Age:  75 y.o.        LOS:  LOS: 1 day        Reason for Consult: lung nodules    IMPRESSION:   Multiple pulmonary nodules - R91.8  OSKAR - G47.33  Hx of PE   RECOMMENDATIONS:   Compensated respiratory status; monitor for goal SPO2> 91%  Sputum culture and AFB smear and culture  Agree with ID for HIV test, Q TB test, parasite blood exam, echo  I will check MYRNA, ANCA, ACE, RF, CCP  Any indication for bronchoscopy based on clinical course and inability to collect respiratory samples for culture and AFB smear  He is on Coumadin and reversal may be required  May use bronchodilators for wheezing, pulmonary hygiene care  Monitor off of antibiotic  Aspiration prevention bundle, head of the bed at 30' all times  May use CPAP at night to sleep with once AFB negative  Glycemic control  Stress ulcer and DVT prophylaxis  Discussed care plan with patient, Sydney Torres, Macho  Will defer respective systems problem management to primary and other consultant and follow patient with primary and other team  Further recommendations will be based on the patient's response to recommended treatment and results of the investigation ordered. [x]Total care time exclusive of procedures 65 minutes with complex decision making performed and > 50% time spent in face to face consultation. HPI:   Mr. Venice Rivera is a 75 y.o. male who is known to my associate Dr. Gaston Byrd as he came this year in our office for evaluation for exertional dyspnea symptoms. The patient has prior history of pulmonary embolism. He had massive pulmonary embolism in July 2007 following travel history, and was placed on chronic anticoagulation therapy. Patient denies any prior history of asthma or COPD. He reports prior sleep study being negative for sleep apnea in 2007.  Patient has prior COVID infections in the last 2 years-December 2020, and December 2021. Patient also took [address] vaccinations- March 2021, April 2021, and subsequently booster dose in April 2022. Following COVID vaccination booster dose in April 2022, patient developed flu-like symptoms with fever, shortness of breath, fatigue. Subsequently, he has traveled to [facility]. Patient saw his PCP April 2022. Reports of dyspnea on exertion, cough and phlegm, weight loss of 50 lbs since April 2022 and was found to be hypoxemic during walk test with PCP  He was placed on home oxygen therapy. He had CT chest done May 2022 [place] which was negative for PEs-while noted with multiple lung nodules. Patient on chronic coumadin therapy for history of PEs. Eventually on repeat 6 min walk test during 07/2022 in our office he came off of O2. A follow up CT chest 09/20/22 at [facility] showed worsening in lung nodules leading to recommendation of hospitalization for work up of Tb from Dr. Giuliana Karimi. He reports long back PPD that was negative. Wife is asymptomatic. In past several months, he denies any night sweat, fevers, chills, adenopathy, chest pain, hemoptysis, lightheadedness, palpitations, syncope, orthopnea, paroxysmal nocturnal dyspnea.        Review of Systems  General ROS: negative for  - fever, chills, malaise  Psychological ROS: negative for - anxiety or depression  Ophthalmic ROS: negative for - eye pain, loss of vision or photophobia  ENT ROS: negative for - epistaxis, headaches, sinus pain or vocal changes  Allergy and Immunology ROS: negative for - hives or postnasal drip  Hematological and Lymphatic ROS: negative for - bleeding problems, blood clots, jaundice, pallor or swollen lymph nodes  Endocrine ROS: negative for - skin changes, temperature intolerance  Cardiovascular ROS: negative for - chest pain, murmur, orthopnea, palpitations or pnd  Gastrointestinal ROS: no nausea, vomiting, abdominal pain, change in bowel habits, or black or bloody stools  Genito-Urinary ROS: no dysuria, trouble voiding, or hematuria  Musculoskeletal ROS: negative for - muscle pain or muscular weakness  Neurological ROS: no TIA or stroke symptoms  Dermatological ROS: negative for - pruritus, rash or skin lesion changes     Past Medical History  Past Medical History:   Diagnosis Date    COVID     Hypoxemia     Pulmonary embolism (HCC)        Past Surgical History  History reviewed. No pertinent surgical history. Family History  History reviewed. No pertinent family history. Social History  Social History     Tobacco Use    Smoking status: Never    Smokeless tobacco: Never        Medications  Current Facility-Administered Medications   Medication Dose Route Frequency    albuterol (PROVENTIL HFA, VENTOLIN HFA, PROAIR HFA) inhaler 2 Puff  2 Puff Inhalation Q4H PRN    warfarin (COUMADIN) tablet 4 mg  4 mg Oral ONCE    Warfarin - Pharmacy to Dose  1 Each Other Rx Dosing/Monitoring     Prior to Admission medications    Medication Sig Start Date End Date Taking? Authorizing Provider   warfarin (COUMADIN) 4 mg tablet Take 4 mg by mouth every other day. Yes Provider, Historical   warfarin (COUMADIN) 5 mg tablet Take 5 mg by mouth every other day. Yes Provider, Historical       Allergy  No Known Allergies    Physical Exam:   Vital Signs:    Blood pressure 117/73, pulse 78, temperature 97.6 °F (36.4 °C), resp. rate 18, height 5' 7\" (1.702 m), weight 113.4 kg (250 lb), SpO2 96 %. Body mass index is 39.16 kg/m². O2 Device: None (Room air)       Temp (24hrs), Av.4 °F (36.9 °C), Min:97.6 °F (36.4 °C), Max:98.7 °F (37.1 °C)       Intake/Output:   Last shift:      No intake/output data recorded. Last 3 shifts: No intake/output data recorded.   No intake or output data in the 24 hours ending 22 1332   General: in no respiratory distress and acyanotic, alert, and oriented times 3, no distress, moderately obese, on room air  HEENT: PERRLA, EOMI, fundi benign, throat normal without erythema or exudate  Neck: No abnormally enlarged lymph nodes or thyroid, supple  Chest: obese chest wall  Lungs: moderate air entry; few rhonchi bl scattered, no wheezes bilaterally, normal percussion bilaterally, no tenderness/ rash; exam limitation secondary to body habitus  Heart: Regular rate and rhythm, S1S2 present, or without murmur or extra heart sounds  Abdomen: obese, bowel sounds normoactive, tympanic, abdomen is soft without significant tenderness, masses, organomegaly or guarding  Extremity: bl pedal edema, no cyanosis, peripheral pulses 2+ and symmetric  Neuro: alert, oriented x 3, no defects noted in general exam.  Skin: Skin color, texture, turgor normal. No rashes or lesions    Labs Reviewed:  Recent Results (from the past 24 hour(s))   POC LACTIC ACID    Collection Time: 09/22/22  2:39 PM   Result Value Ref Range    Lactic Acid (POC) 1.33 0.40 - 2.00 mmol/L   CBC WITH AUTOMATED DIFF    Collection Time: 09/22/22  2:45 PM   Result Value Ref Range    WBC 6.9 4.6 - 13.2 K/uL    RBC 4.99 4.35 - 5.65 M/uL    HGB 13.5 13.0 - 16.0 g/dL    HCT 42.8 36.0 - 48.0 %    MCV 85.8 78.0 - 100.0 FL    MCH 27.1 24.0 - 34.0 PG    MCHC 31.5 31.0 - 37.0 g/dL    RDW 14.3 11.6 - 14.5 %    PLATELET 886 712 - 405 K/uL    MPV 9.7 9.2 - 11.8 FL    NRBC 0.0 0  WBC    ABSOLUTE NRBC 0.00 0.00 - 0.01 K/uL    NEUTROPHILS 61 40 - 73 %    LYMPHOCYTES 21 21 - 52 %    MONOCYTES 8 3 - 10 %    EOSINOPHILS 9 (H) 0 - 5 %    BASOPHILS 1 0 - 2 %    IMMATURE GRANULOCYTES 1 (H) 0.0 - 0.5 %    ABS. NEUTROPHILS 4.2 1.8 - 8.0 K/UL    ABS. LYMPHOCYTES 1.4 0.9 - 3.6 K/UL    ABS. MONOCYTES 0.5 0.05 - 1.2 K/UL    ABS. EOSINOPHILS 0.6 (H) 0.0 - 0.4 K/UL    ABS. BASOPHILS 0.1 0.0 - 0.1 K/UL    ABS. IMM.  GRANS. 0.0 0.00 - 0.04 K/UL    DF AUTOMATED     METABOLIC PANEL, COMPREHENSIVE    Collection Time: 09/22/22  2:45 PM   Result Value Ref Range    Sodium 136 136 - 145 mmol/L    Potassium 4.3 3.5 - 5.5 mmol/L Chloride 104 100 - 111 mmol/L    CO2 27 21 - 32 mmol/L    Anion gap 5 3.0 - 18 mmol/L    Glucose 117 (H) 74 - 99 mg/dL    BUN 12 7.0 - 18 MG/DL    Creatinine 0.89 0.6 - 1.3 MG/DL    BUN/Creatinine ratio 13 12 - 20      GFR est AA >60 >60 ml/min/1.73m2    GFR est non-AA >60 >60 ml/min/1.73m2    Calcium 9.4 8.5 - 10.1 MG/DL    Bilirubin, total 0.5 0.2 - 1.0 MG/DL    ALT (SGPT) 28 16 - 61 U/L    AST (SGOT) 25 10 - 38 U/L    Alk.  phosphatase 113 45 - 117 U/L    Protein, total 8.2 6.4 - 8.2 g/dL    Albumin 3.4 3.4 - 5.0 g/dL    Globulin 4.8 (H) 2.0 - 4.0 g/dL    A-G Ratio 0.7 (L) 0.8 - 1.7     CULTURE, BLOOD    Collection Time: 09/22/22  2:45 PM    Specimen: Blood   Result Value Ref Range    Special Requests: NO SPECIAL REQUESTS      Culture result: NO GROWTH AFTER 17 HOURS     CULTURE, BLOOD    Collection Time: 09/22/22  2:45 PM    Specimen: Blood   Result Value Ref Range    Special Requests: NO SPECIAL REQUESTS      Culture result: NO GROWTH AFTER 17 HOURS     TSH 3RD GENERATION    Collection Time: 09/22/22  2:45 PM   Result Value Ref Range    TSH 1.29 0.36 - 3.74 uIU/mL   SED RATE (ESR)    Collection Time: 09/22/22  2:45 PM   Result Value Ref Range    Sed rate, automated 46 (H) 0 - 20 mm/hr   C REACTIVE PROTEIN, QT    Collection Time: 09/22/22  2:45 PM   Result Value Ref Range    C-Reactive protein 5.7 (H) 0 - 0.3 mg/dL   TROPONIN-HIGH SENSITIVITY    Collection Time: 09/22/22  2:45 PM   Result Value Ref Range    Troponin-High Sensitivity 7 0 - 78 ng/L   PROTHROMBIN TIME + INR    Collection Time: 09/22/22  2:45 PM   Result Value Ref Range    Prothrombin time 25.3 (H) 11.5 - 15.2 sec    INR 2.3 (H) 0.8 - 1.2     BLOOD GAS, ARTERIAL POC    Collection Time: 09/22/22  9:51 PM   Result Value Ref Range    Device: ROOM AIR      FIO2 (POC) 21 %    pH (POC) 7.42 7.35 - 7.45      pCO2 (POC) 38.7 35.0 - 45.0 MMHG    pO2 (POC) 75 (L) 80 - 100 MMHG    HCO3 (POC) 25.1 22 - 26 MMOL/L    sO2 (POC) 95.2 92 - 97 %    Base excess (POC) 0.7 mmol/L    Allens test (POC) NOT APPLICABLE      Total resp. rate 18      Site RIGHT RADIAL      Specimen type (POC) ARTERIAL      Performed by Joey Valdes    CBC WITH AUTOMATED DIFF    Collection Time: 09/23/22  1:47 AM   Result Value Ref Range    WBC 6.0 4.6 - 13.2 K/uL    RBC 4.70 4.35 - 5.65 M/uL    HGB 12.6 (L) 13.0 - 16.0 g/dL    HCT 41.2 36.0 - 48.0 %    MCV 87.7 78.0 - 100.0 FL    MCH 26.8 24.0 - 34.0 PG    MCHC 30.6 (L) 31.0 - 37.0 g/dL    RDW 14.4 11.6 - 14.5 %    PLATELET 516 169 - 128 K/uL    MPV 9.8 9.2 - 11.8 FL    NRBC 0.0 0  WBC    ABSOLUTE NRBC 0.00 0.00 - 0.01 K/uL    NEUTROPHILS 58 40 - 73 %    LYMPHOCYTES 22 21 - 52 %    MONOCYTES 7 3 - 10 %    EOSINOPHILS 12 (H) 0 - 5 %    BASOPHILS 1 0 - 2 %    IMMATURE GRANULOCYTES 1 (H) 0.0 - 0.5 %    ABS. NEUTROPHILS 3.5 1.8 - 8.0 K/UL    ABS. LYMPHOCYTES 1.3 0.9 - 3.6 K/UL    ABS. MONOCYTES 0.4 0.05 - 1.2 K/UL    ABS. EOSINOPHILS 0.7 (H) 0.0 - 0.4 K/UL    ABS. BASOPHILS 0.0 0.0 - 0.1 K/UL    ABS. IMM. GRANS. 0.0 0.00 - 0.04 K/UL    DF AUTOMATED     METABOLIC PANEL, COMPREHENSIVE    Collection Time: 09/23/22  1:47 AM   Result Value Ref Range    Sodium 136 136 - 145 mmol/L    Potassium 4.5 3.5 - 5.5 mmol/L    Chloride 104 100 - 111 mmol/L    CO2 30 21 - 32 mmol/L    Anion gap 2 (L) 3.0 - 18 mmol/L    Glucose 103 (H) 74 - 99 mg/dL    BUN 13 7.0 - 18 MG/DL    Creatinine 0.90 0.6 - 1.3 MG/DL    BUN/Creatinine ratio 14 12 - 20      GFR est AA >60 >60 ml/min/1.73m2    GFR est non-AA >60 >60 ml/min/1.73m2    Calcium 9.4 8.5 - 10.1 MG/DL    Bilirubin, total 0.5 0.2 - 1.0 MG/DL    ALT (SGPT) 26 16 - 61 U/L    AST (SGOT) 19 10 - 38 U/L    Alk.  phosphatase 102 45 - 117 U/L    Protein, total 7.8 6.4 - 8.2 g/dL    Albumin 3.1 (L) 3.4 - 5.0 g/dL    Globulin 4.7 (H) 2.0 - 4.0 g/dL    A-G Ratio 0.7 (L) 0.8 - 1.7     MAGNESIUM    Collection Time: 09/23/22  1:47 AM   Result Value Ref Range    Magnesium 2.0 1.6 - 2.6 mg/dL   SED RATE (ESR)    Collection Time: 09/23/22  1:47 AM   Result Value Ref Range    Sed rate, automated 61 (H) 0 - 20 mm/hr   C REACTIVE PROTEIN, QT    Collection Time: 09/23/22  1:47 AM   Result Value Ref Range    C-Reactive protein 5.2 (H) 0 - 0.3 mg/dL   TROPONIN-HIGH SENSITIVITY    Collection Time: 09/23/22  1:47 AM   Result Value Ref Range    Troponin-High Sensitivity 8 0 - 78 ng/L   PROTHROMBIN TIME + INR    Collection Time: 09/23/22  1:47 AM   Result Value Ref Range    Prothrombin time 27.6 (H) 11.5 - 15.2 sec    INR 2.5 (H) 0.8 - 1.2     HIV 1/2 AG/AB, 4TH GENERATION,W RFLX CONFIRM    Collection Time: 09/23/22  8:00 AM   Result Value Ref Range    HIV 1/2 Interpretation NONREACTIVE NR      HIV 1/2 result comment SEE NOTE     HEPATITIS PANEL, ACUTE    Collection Time: 09/23/22  8:00 AM   Result Value Ref Range    Hepatitis A, IgM Negative NEG      __          Hepatitis B surface Ag 0.58 <1.00 Index    Hep B surface Ag Interp. Negative NEG      __          Hepatitis B core, IgM Negative NEG      __          Hepatitis C virus Ab 0.08 <0.80 Index    Hep C virus Ab Interp.  Negative NEG      Hep C  virus Ab comment         ECHO ADULT COMPLETE    Collection Time: 09/23/22 10:01 AM   Result Value Ref Range    Est. RA Pressure 3 mmHg    Fractional Shortening 2D 33 28 - 44 %    LV ESV Index BP 11 mL/m2    LV EDV Index BP 26 mL/m2    LV ESV Index A4C 15 mL/m2    LV EDV Index A4C 35 mL/m2    LV ESV Index A2C 8 mL/m2    LV EDV Index A2C 19 mL/m2    LVIDd Index 1.80 cm/m2    LVIDs Index 1.22 cm/m2    LV RWT Ratio 0.45     LV Mass 2D 145.6 88 - 224 g    LV Mass 2D Index 65.6 49 - 115 g/m2    MV E/A 0.88     E/E' Ratio (Averaged) 6.92     E/E' Lateral 5.70     E/E' Septal 8.14     LA Volume Index BP 16 16 - 34 ml/m2    LA Volume Index A/L 18 16 - 34 mL/m2    LVOT Stroke Volume Index 36.9 mL/m2    LVOT Area 4.5 cm2    LA Volume Index 2C 15 (A) 16 - 34 mL/m2    LA Volume Index 4C 16 16 - 34 mL/m2    LA Size Index 1.53 cm/m2    LA/AO Root Ratio 1.06     Ao Root Index 1.44 cm/m2    AV Velocity Ratio 0.47     LVOT:AV VTI Index 0.53     GARLAND/BSA VTI 1.1 cm2/m2    GARLAND/BSA Peak Velocity 0.9 cm2/m2    RVSP 26 mmHg    PASP 26 mmHg    IVSd 1.3 (A) 0.6 - 1.0 cm    LVIDd 4.0 (A) 4.2 - 5.9 cm    LVIDs 2.7 cm    LVOT Diameter 2.4 cm    LVPWd 0.9 0.6 - 1.0 cm    Global Longitudinal Strain -10.9 %    Global Longitudinal Strain -15.9 %    Global Longitudinal Strain -12.3 %    Global Longitudinal Strain -13.1 %    EF BP 56 55 - 100 %    LV Ejection Fraction A2C 59 %    LV Ejection Fraction A4C 56 %    LV EDV A2C 43 mL    LV EDV A4C 78 mL    LV EDV BP 58 (A) 67 - 155 mL    LV ESV A2C 18 mL    LV ESV A4C 34 mL    LV ESV BP 25 22 - 58 mL    LVOT Peak Gradient 3 mmHg    LVOT Mean Gradient 2 mmHg    LVOT SV 81.5 ml    LVOT Peak Velocity 0.9 m/s    LVOT VTI 18.1 cm    RVIDd 3.2 cm    RV Free Wall Peak S' 12 cm/s    LA Diameter 3.4 cm    LA Volume A/L 39 mL    LA Volume 2C 33 18 - 58 mL    LA Volume 4C 36 18 - 58 mL    LA Volume BP 36 18 - 58 mL    AV Area by Peak Velocity 2.1 cm2    AV Area by VTI 2.4 cm2    AV Peak Gradient 14 mmHg    AV Mean Gradient 7 mmHg    AV Peak Velocity 1.9 m/s    AV Mean Velocity 1.3 m/s    AV VTI 34.4 cm    MV A Velocity 0.65 m/s    MV E Velocity 0.57 m/s    LV E' Lateral Velocity 10 cm/s    LV E' Septal Velocity 7 cm/s    PV AT 62.8 ms    PV Peak Gradient 5 mmHg    PV Mean Gradient 2 mmHg    PV Max Velocity 1.1 m/s    PV Mean Velocity 0.7 m/s    TR Peak Gradient 23 mmHg    TR Max Velocity 2.38 m/s    Aortic Root 3.2 cm           Recent Labs     09/22/22  2151   FIO2I 21   HCO3I 25.1   PCO2I 38.7   PHI 7.42   PO2I 75*       All Micro Results       Procedure Component Value Units Date/Time    AFB CULTURE + SMEAR W/RFLX ID FROM CULTURE [[id number]]     Order Status: Sent     CULTURE, BLOOD [[id number]] Collected: 09/22/22 1445    Order Status: Completed Specimen: Blood Updated: 09/23/22 0832     Special Requests: NO SPECIAL REQUESTS        Culture result: NO GROWTH AFTER 17 HOURS       CULTURE, BLOOD 2nd DRAW (required for [facility]) [[id number]] Collected: 09/22/22 1445    Order Status: Completed Specimen: Blood Updated: 09/23/22 0832     Special Requests: NO SPECIAL REQUESTS        Culture result: NO GROWTH AFTER 17 HOURS       CRYPTOCOCCUS AG W/REFLEX TITER [[id number]] Collected: 09/23/22 0800    Order Status: No result Updated: 09/23/22 0817    AFB CULTURE + SMEAR W/RFLX ID FROM CULTURE [[id number]] Collected: 09/23/22 0800    Order Status: Canceled     AFB CULTURE + SMEAR W/RFLX ID FROM CULTURE [[id number]] Collected: 09/23/22 0615    Order Status: Sent Updated: 09/23/22 0709    AFB CULTURE + SMEAR W/RFLX ID FROM CULTURE [[id number]]     Order Status: Canceled     AFB CULTURE + SMEAR W/RFLX ID FROM CULTURE [[id number]]     Order Status: Canceled     RESPIRATORY VIRUS PANEL W/COVID-19, PCR [[id number]]     Order Status: Sent Specimen: NASOPHARYNGEAL SWAB     OVA & PARASITES, STOOL [[id number]]     Order Status: Canceled Specimen: Feces from Stool     CRYPTOCOCCUS AG [place] [address] [[id number]] Collected: 09/22/22 2000    Order Status: Canceled Specimen: Serum from Blood     ACID FAST SMEAR [[id number]]     Order Status: Sent                 Imaging:  [x]I have personally reviewed the patients chest radiographs images and report with the patient  No results found for this or any previous visit. Results from Hospital Encounter encounter on 09/22/22    CTA CHEST ABD PELV W CONT    Narrative  EXAM: CTA of the chest, abdomen, and pelvis    CLINICAL INDICATION/HISTORY: Fever, weight loss, hypoxemia    COMPARISON: Outside chest CT report dated 9/20/2022. Images not available for  comparison. TECHNIQUE: Helical CT imaging of the chest initially performed without  intravenous contrast. Helical CT imaging of the chest, abdomen, and pelvis was  then performed with intravenous contrast. Maximum intensity projections were  generated.  One or more dose reduction techniques were used on this CT: automated  exposure control, adjustment of the mAs and/or kVp according to patient size,  and iterative reconstruction techniques. The specific techniques used on this  CT exam have been documented in the patient's electronic medical record. Digital  Imaging and Communications in Medicine (DICOM) format image data are available  to nonaffiliated external healthcare facilities or entities on a secured, media  free, reciprocally searchable basis with patient authorization for at least a  12-month period after this study. _______________    FINDINGS:    VASCULAR: No intramural hematoma throughout the thoracic aorta on noncontrast  imaging. Mild atherosclerotic calcifications. No aortic aneurysm or dissection. No pulmonary embolism. CHEST:    LUNGS: Numerous bilateral pulmonary nodules identified measuring less than 6 mm  in size. Most of these are in a centrilobular pattern. No dense consolidation. AIRWAY: Scattered secretions in the airway. PLEURA: Normal, with no effusion or pneumothorax. MEDIASTINUM: Normal heart size. No pericardial effusion. LYMPH NODES: Mildly enlarged hilar lymph nodes which are likely reactive. No  bulky lymphadenopathy in the chest.    OTHER: None.    ===============    ABDOMEN/PELVIS:    LIVER, BILIARY: Mild lobular contours of the liver. Subcentimeter hypodensity in  the inferior right hepatic lobe is too small to characterize. No biliary  dilation. Cholelithiasis with mild gallbladder wall thickening. PANCREAS: Unremarkable. SPLEEN: Unremarkable. ADRENALS: Unremarkable. KIDNEYS/URETERS/BLADDER: Peripelvic renal cysts which do not require follow-up  imaging. Left renal calculi. No ureteral calculi or hydronephrosis. No  suspicious renal mass. PELVIC ORGANS: Unremarkable. LYMPH NODES: No enlarged lymph nodes. GASTROINTESTINAL TRACT: No bowel dilation or wall thickening. Colonic  diverticulosis with no evidence of diverticulitis.  The appendix is normal.    BONES: No acute or aggressive osseous abnormalities identified. OTHER: Fat-containing umbilical hernia.    _______________    Impression  1. No aortic aneurysm or dissection. No pulmonary embolism. 2. Numerous bilateral pulmonary nodules which are likely infectious or  inflammatory in etiology. Recommend follow-up chest CT in 2-3 months after  treatment to assure resolution. 3. Subtle nodular contours of the liver raising the possibility of chronic liver  disease. 4. Cholelithiasis with diffuse gallbladder wall thickening which could relate to  acute or chronic cholecystitis versus passive congestion.        [x]See my orders for details            Kristel Vines MD

## 2022-09-23 NOTE — PROGRESS NOTES
Hospitalist Progress Note    Patient: Francois Melchor MRN: [id number]  CSN: [id number]    YOB: 1955  Age: 75 y.o. 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 Dyspnea comfortable on room air add Pulmicort inhaler and albuterol  2. Gallbladder inflammation passive congestion await echo  3. Hilar adenopathy AFB smears x3 if nondiagnostic possible dark  4. Pulmonary embolism history continue Coumadin  5. Cholelithiasis not symptomatic could be passive congestion await echo results  6. Suspicious mole needs Derm follow-up  Patient Active Problem List   Diagnosis Code    Shortness of breath R06.02       Subjective:    I am doing well    Review of systems:    Constitutional: denies fevers, chills, myalgias  Respiratory:+SOB, cough  Cardiovascular: denies chest pain, palpitations  Gastrointestinal: denies nausea, vomiting, diarrhea      Vital signs/Intake and Output:  Visit Vitals  /63   Pulse 82   Temp 97 °F (36.1 °C)   Resp 18   Ht 5' 7\" (1.702 m)   Wt 113.4 kg (250 lb)   SpO2 96%   BMI 39.16 kg/m²     Current Shift:  No intake/output data recorded. Last three shifts:  No intake/output data recorded.     Exam:    General: Well developed, alert, NAD, OX3  Head/Neck: NCAT, supple, No masses, No lymphadenopathy  CVS:Regular rate   Lungs:C cough with inspiration  Abdomen: Soft, Nontender, No distention, Normal Bowel sounds, No hepatomegaly  Extremities: No C/C/E, pulses palpable 2+ venous stasis changes on the left  Skin:n has a mole that I would like for him to get checked once discharge   Neuro:grossly normal , follows commands  Psych:appropriate                Labs: Results:       Chemistry Recent Labs     09/23/22  0147 09/22/22  1445   * 117*    136   K 4.5 4.3    104   CO2 30 27   BUN 13 12   CREA 0.90 0.89   CA 9.4 9.4   AGAP 2* 5 BUCR 14 13    113   TP 7.8 8.2   ALB 3.1* 3.4   GLOB 4.7* 4.8*   AGRAT 0.7* 0.7*      CBC w/Diff Recent Labs     09/23/22  0147 09/22/22  1445   WBC 6.0 6.9   RBC 4.70 4.99   HGB 12.6* 13.5   HCT 41.2 42.8    215   GRANS 58 61   LYMPH 22 21   EOS 12* 9*      Cardiac Enzymes No results for input(s): CPK, CKND1, ARAVIND in the last 72 hours. No lab exists for component: CKRMB, TROIP   Coagulation Recent Labs     09/23/22  1335 09/23/22  0147   PTP 28.2* 27.6*   INR 2.6* 2.5*       Lipid Panel No results found for: CHOL, CHOLPOCT, CHOLX, CHLST, CHOLV, 636052, HDL, HDLP, LDL, LDLC, DLDLP, 364874, VLDLC, VLDL, TGLX, TRIGL, TRIGP, TGLPOCT, CHHD, CHHDX   BNP No results for input(s): BNPP in the last 72 hours.    Liver Enzymes Recent Labs     09/23/22  0147   TP 7.8   ALB 3.1*         Thyroid Studies Lab Results   Component Value Date/Time    TSH 1.29 09/22/2022 02:45 PM        Procedures/imaging: see electronic medical records for all procedures/Xrays and details which were not copied into this note but were reviewed prior to creation of Bernadette Kinney MD

## 2022-09-23 NOTE — PROGRESS NOTES
Pharmacy Dosing Services: Warfarin    Consult for Warfarin Dosing by Pharmacy by Dr. Rola Kerr provided for this 75 y.o.  male , for indication of DVT. Dose to achieve an INR goal of 2-3    Patient Home Regimen 4 mg alternating with 5 mg Daily. Patient took  Warfarin  4 (mg) already today per nurse. LABS    PT/INR Lab Results   Component Value Date/Time    INR 2.3 (H) 09/22/2022 02:45 PM      Platelets Lab Results   Component Value Date/Time    PLATELET 138 46/51/2656 02:45 PM      H/H     Lab Results   Component Value Date/Time    HGB 13.5 09/22/2022 02:45 PM        Warfarin Administrations (last 168 hours)       None          Pharmacy to follow daily and will provide subsequent Warfarin dosing based on clinical status.     Felice Costa, [facility]   Contact information     [phone]

## 2022-09-23 NOTE — PROGRESS NOTES
Problem: Risk for Spread of Infection  Goal: Prevent transmission of infectious organism to others  Description: Prevent the transmission of infectious organisms to other patients, staff members, and visitors. Outcome: Progressing Towards Goal     Problem: Activity Intolerance  Goal: *Oxygen saturation during activity within specified parameters  Outcome: Progressing Towards Goal     Problem: Falls - Risk of  Goal: *Absence of Falls  Description: Document Liane Fall Risk and appropriate interventions in the flowsheet.   Outcome: Progressing Towards Goal  Note: Fall Risk Interventions:

## 2022-09-24 LAB
ACID FAST STN SPEC: NEGATIVE
ALBUMIN SERPL-MCNC: 3 G/DL (ref 3.4–5)
ALBUMIN/GLOB SERPL: 0.7 {RATIO} (ref 0.8–1.7)
ALP SERPL-CCNC: 101 U/L (ref 45–117)
ALT SERPL-CCNC: 26 U/L (ref 16–61)
ANION GAP SERPL CALC-SCNC: 4 MMOL/L (ref 3–18)
AST SERPL-CCNC: 21 U/L (ref 10–38)
BASOPHILS # BLD: 0.1 K/UL (ref 0–0.1)
BASOPHILS NFR BLD: 1 % (ref 0–2)
BILIRUB SERPL-MCNC: 0.4 MG/DL (ref 0.2–1)
BUN SERPL-MCNC: 15 MG/DL (ref 7–18)
BUN/CREAT SERPL: 18 (ref 12–20)
CALCIUM SERPL-MCNC: 9 MG/DL (ref 8.5–10.1)
CHLORIDE SERPL-SCNC: 103 MMOL/L (ref 100–111)
CO2 SERPL-SCNC: 30 MMOL/L (ref 21–32)
CORTIS AM PEAK SERPL-MCNC: 8.2 UG/DL (ref 4.3–22.45)
CREAT SERPL-MCNC: 0.82 MG/DL (ref 0.6–1.3)
DIFFERENTIAL METHOD BLD: ABNORMAL
EOSINOPHIL # BLD: 0.7 K/UL (ref 0–0.4)
EOSINOPHIL NFR BLD: 12 % (ref 0–5)
ERYTHROCYTE [DISTWIDTH] IN BLOOD BY AUTOMATED COUNT: 14.3 % (ref 11.6–14.5)
EST. AVERAGE GLUCOSE BLD GHB EST-MCNC: 108 MG/DL
GLOBULIN SER CALC-MCNC: 4.4 G/DL (ref 2–4)
GLUCOSE SERPL-MCNC: 95 MG/DL (ref 74–99)
HBA1C MFR BLD: 5.4 % (ref 4.2–5.6)
HCT VFR BLD AUTO: 38.6 % (ref 36–48)
HGB BLD-MCNC: 12 G/DL (ref 13–16)
IMM GRANULOCYTES # BLD AUTO: 0 K/UL (ref 0–0.04)
IMM GRANULOCYTES NFR BLD AUTO: 1 % (ref 0–0.5)
INR PPP: 2.4 (ref 0.8–1.2)
LYMPHOCYTES # BLD: 1.2 K/UL (ref 0.9–3.6)
LYMPHOCYTES NFR BLD: 19 % (ref 21–52)
MCH RBC QN AUTO: 27 PG (ref 24–34)
MCHC RBC AUTO-ENTMCNC: 31.1 G/DL (ref 31–37)
MCV RBC AUTO: 86.7 FL (ref 78–100)
MONOCYTES # BLD: 0.5 K/UL (ref 0.05–1.2)
MONOCYTES NFR BLD: 7 % (ref 3–10)
MYCOBACTERIUM SPEC QL CULT: NORMAL
NEUTS SEG # BLD: 3.7 K/UL (ref 1.8–8)
NEUTS SEG NFR BLD: 60 % (ref 40–73)
NRBC # BLD: 0 K/UL (ref 0–0.01)
NRBC BLD-RTO: 0 PER 100 WBC
PLATELET # BLD AUTO: 185 K/UL (ref 135–420)
PMV BLD AUTO: 9.6 FL (ref 9.2–11.8)
POTASSIUM SERPL-SCNC: 4.1 MMOL/L (ref 3.5–5.5)
PROT SERPL-MCNC: 7.4 G/DL (ref 6.4–8.2)
PROTHROMBIN TIME: 26.4 SEC (ref 11.5–15.2)
RBC # BLD AUTO: 4.45 M/UL (ref 4.35–5.65)
RHEUMATOID FACT SERPL-ACNC: <10 IU/ML
SODIUM SERPL-SCNC: 137 MMOL/L (ref 136–145)
SPECIMEN PREPARATION: NORMAL
SPECIMEN SOURCE: NORMAL
TSH SERPL DL<=0.05 MIU/L-ACNC: 0.67 UIU/ML (ref 0.36–3.74)
WBC # BLD AUTO: 6.1 K/UL (ref 4.6–13.2)

## 2022-09-24 PROCEDURE — 85610 PROTHROMBIN TIME: CPT

## 2022-09-24 PROCEDURE — 86037 ANCA TITER EACH ANTIBODY: CPT

## 2022-09-24 PROCEDURE — 82533 TOTAL CORTISOL: CPT

## 2022-09-24 PROCEDURE — 86612 BLASTOMYCES ANTIBODY: CPT

## 2022-09-24 PROCEDURE — 80053 COMPREHEN METABOLIC PANEL: CPT

## 2022-09-24 PROCEDURE — 65270000029 HC RM PRIVATE

## 2022-09-24 PROCEDURE — 82784 ASSAY IGA/IGD/IGG/IGM EACH: CPT

## 2022-09-24 PROCEDURE — 82164 ANGIOTENSIN I ENZYME TEST: CPT

## 2022-09-24 PROCEDURE — 86431 RHEUMATOID FACTOR QUANT: CPT

## 2022-09-24 PROCEDURE — 36415 COLL VENOUS BLD VENIPUNCTURE: CPT

## 2022-09-24 PROCEDURE — 86225 DNA ANTIBODY NATIVE: CPT

## 2022-09-24 PROCEDURE — 83520 IMMUNOASSAY QUANT NOS NONAB: CPT

## 2022-09-24 PROCEDURE — 86635 COCCIDIOIDES ANTIBODY: CPT

## 2022-09-24 PROCEDURE — 74011250637 HC RX REV CODE- 250/637: Performed by: INTERNAL MEDICINE

## 2022-09-24 PROCEDURE — 87116 MYCOBACTERIA CULTURE: CPT

## 2022-09-24 PROCEDURE — 84443 ASSAY THYROID STIM HORMONE: CPT

## 2022-09-24 PROCEDURE — 85025 COMPLETE CBC W/AUTO DIFF WBC: CPT

## 2022-09-24 RX ORDER — WARFARIN 2.5 MG/1
5 TABLET ORAL ONCE
Status: COMPLETED | OUTPATIENT
Start: 2022-09-24 | End: 2022-09-24

## 2022-09-24 RX ADMIN — ALBUTEROL SULFATE 2 PUFF: 108 INHALANT RESPIRATORY (INHALATION) at 08:00

## 2022-09-24 RX ADMIN — ALBUTEROL SULFATE 2 PUFF: 108 INHALANT RESPIRATORY (INHALATION) at 04:00

## 2022-09-24 RX ADMIN — ALBUTEROL SULFATE 2 PUFF: 108 INHALANT RESPIRATORY (INHALATION) at 22:01

## 2022-09-24 RX ADMIN — ALBUTEROL SULFATE 2 PUFF: 108 INHALANT RESPIRATORY (INHALATION) at 16:00

## 2022-09-24 RX ADMIN — ALBUTEROL SULFATE 2 PUFF: 108 INHALANT RESPIRATORY (INHALATION) at 02:21

## 2022-09-24 RX ADMIN — WARFARIN SODIUM 5 MG: 2.5 TABLET ORAL at 17:40

## 2022-09-24 RX ADMIN — ALBUTEROL SULFATE 2 PUFF: 108 INHALANT RESPIRATORY (INHALATION) at 12:00

## 2022-09-24 NOTE — PROGRESS NOTES
Pharmacy Dosing Services: Warfarin    Consult for Warfarin Dosing by Pharmacy by Dr. Jeanmarie Miller provided for this 75 y.o.  male , for indication of Pulmonary Embolism. Day of Therapy  - continuation of home med  Dose to achieve an INR goal of 2-3    Order entered for Warfarin 5 mg to be given today at 18:00. Significant drug interactions: none  LABS    PT/INR Lab Results   Component Value Date/Time    INR 2.4 (H) 09/24/2022 05:25 AM      Platelets Lab Results   Component Value Date/Time    PLATELET 581 32/68/7693 05:25 AM      H/H     Lab Results   Component Value Date/Time    HGB 12.0 (L) 09/24/2022 05:25 AM        Warfarin Administrations (last 168 hours)       Date/Time Action Medication Dose    09/23/22 1708 Given    warfarin (COUMADIN) tablet 4 mg 4 mg          Pharmacy to follow daily and will provide subsequent Warfarin dosing based on clinical status.   BALDEMAR Whiteside  Contact information [phone]

## 2022-09-24 NOTE — PROGRESS NOTES
Hospitalist Progress Note    Patient: Omid Read MRN: [id number]  CSN: [id number]    YOB: 1955  Age: 75 y.o. 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 Dyspnea comfortable on room air add Pulmicort inhaler and albuterol  2. Gallbladder inflammation passive congestion await echo  3. Hilar adenopathy AFB smears x3 if nondiagnostic possible dark  4. Pulmonary embolism history continue Coumadin  5. Cholelithiasis not symptomatic could be passive congestion await echo results  6. Suspicious mole needs Derm follow-up  7.  Nodular liver check ultrasound hepatology consult on Monday hep profile neg  Patient Active Problem List   Diagnosis Code    Shortness of breath R06.02    Hilar adenopathy R59.0    Cough R05.9       Subjective:    I am doing well  Nebs helped    Review of systems:    Constitutional: denies fevers, chills, myalgias  Respiratory:+SOB, cough  Cardiovascular: denies chest pain, palpitations  Gastrointestinal: denies nausea, vomiting, diarrhea      Vital signs/Intake and Output:  Visit Vitals  /62 (BP 1 Location: Left upper arm, BP Patient Position: At rest)   Pulse 84   Temp 97.6 °F (36.4 °C)   Resp 18   Ht 5' 7\" (1.702 m)   Wt 113.4 kg (250 lb)   SpO2 93%   BMI 39.16 kg/m²     Current Shift:  09/24 0701 - 09/24 1900  In: 240 [P.O.:240]  Out: -   Last three shifts:  09/22 1901 - 09/24 0700  In: 360 [P.O.:360]  Out: -     Exam:    General: Well developed, alert, NAD, OX3  Head/Neck: NCAT, supple, No masses, No lymphadenopathy  CVS:Regular rate   Lungs:C cough with inspiration  Abdomen: Soft, Nontender, No distention, Normal Bowel sounds, No hepatomegaly  Extremities: No C/C/E, pulses palpable 2+ venous stasis changes on the left  Skin:n has a mole that I would like for him to get checked once discharge   Neuro:grossly normal , follows commands  Psych:appropriate                Labs: Results:       Chemistry Recent Labs     09/24/22  0525 09/23/22  0147 09/22/22  1445   GLU 95 103* 117*    136 136   K 4.1 4.5 4.3    104 104   CO2 30 30 27   BUN 15 13 12   CREA 0.82 0.90 0.89   CA 9.0 9.4 9.4   AGAP 4 2* 5   BUCR 18 14 13    102 113   TP 7.4 7.8 8.2   ALB 3.0* 3.1* 3.4   GLOB 4.4* 4.7* 4.8*   AGRAT 0.7* 0.7* 0.7*        CBC w/Diff Recent Labs     09/24/22 0525 09/23/22 0147 09/22/22  1445   WBC 6.1 6.0 6.9   RBC 4.45 4.70 4.99   HGB 12.0* 12.6* 13.5   HCT 38.6 41.2 42.8    186 215   GRANS 60 58 61   LYMPH 19* 22 21   EOS 12* 12* 9*        Cardiac Enzymes No results for input(s): CPK, CKND1, ARAVIND in the last 72 hours. No lab exists for component: CKRMB, TROIP   Coagulation Recent Labs     09/24/22  0525 09/23/22  1335   PTP 26.4* 28.2*   INR 2.4* 2.6*         Lipid Panel No results found for: CHOL, CHOLPOCT, CHOLX, CHLST, CHOLV, 628883, HDL, HDLP, LDL, LDLC, DLDLP, 031988, VLDLC, VLDL, TGLX, TRIGL, TRIGP, TGLPOCT, CHHD, CHHDX   BNP No results for input(s): BNPP in the last 72 hours.    Liver Enzymes Recent Labs     09/24/22  0525   TP 7.4   ALB 3.0*           Thyroid Studies Lab Results   Component Value Date/Time    TSH 0.67 09/24/2022 05:25 AM        Procedures/imaging: see electronic medical records for all procedures/Xrays and details which were not copied into this note but were reviewed prior to creation of Rancho Rice MD

## 2022-09-24 NOTE — PROGRESS NOTES
Problem: Risk for Spread of Infection  Goal: Prevent transmission of infectious organism to others  Description: Prevent the transmission of infectious organisms to other patients, staff members, and visitors. Outcome: Progressing Towards Goal     Problem: Patient Education:  Go to Education Activity  Goal: Patient/Family Education  Outcome: Progressing Towards Goal     Problem: Falls - Risk of  Goal: *Absence of Falls  Description: Document Chasityziggy Cade Fall Risk and appropriate interventions in the flowsheet.   Outcome: Progressing Towards Goal  Note: Fall Risk Interventions:            Medication Interventions: Teach patient to arise slowly, Patient to call before getting OOB

## 2022-09-24 NOTE — PROGRESS NOTES
Problem: Risk for Spread of Infection  Goal: Prevent transmission of infectious organism to others  Description: Prevent the transmission of infectious organisms to other patients, staff members, and visitors. Outcome: Progressing Towards Goal     Problem: Patient Education:  Go to Education Activity  Goal: Patient/Family Education  Outcome: Progressing Towards Goal     Problem: Falls - Risk of  Goal: *Absence of Falls  Description: Document Navya Minium Fall Risk and appropriate interventions in the flowsheet.   Outcome: Progressing Towards Goal  Note: Fall Risk Interventions:            Medication Interventions: Teach patient to arise slowly, Patient to call before getting OOB

## 2022-09-25 ENCOUNTER — HOSPITAL ENCOUNTER (INPATIENT)
Dept: ULTRASOUND IMAGING | Age: 67
Discharge: HOME | DRG: 869 | End: 2022-09-25
Attending: INTERNAL MEDICINE
Payer: MEDICARE

## 2022-09-25 LAB
ACID FAST STN SPEC: NEGATIVE
ALBUMIN SERPL-MCNC: 3 G/DL (ref 3.4–5)
ALBUMIN/GLOB SERPL: 0.8 {RATIO} (ref 0.8–1.7)
ALP SERPL-CCNC: 93 U/L (ref 45–117)
ALT SERPL-CCNC: 25 U/L (ref 16–61)
ANION GAP SERPL CALC-SCNC: 2 MMOL/L (ref 3–18)
AST SERPL-CCNC: 24 U/L (ref 10–38)
BASOPHILS # BLD: 0 K/UL (ref 0–0.1)
BASOPHILS NFR BLD: 1 % (ref 0–2)
BILIRUB SERPL-MCNC: 0.5 MG/DL (ref 0.2–1)
BUN SERPL-MCNC: 13 MG/DL (ref 7–18)
BUN/CREAT SERPL: 17 (ref 12–20)
CALCIUM SERPL-MCNC: 9.1 MG/DL (ref 8.5–10.1)
CHLORIDE SERPL-SCNC: 103 MMOL/L (ref 100–111)
CO2 SERPL-SCNC: 31 MMOL/L (ref 21–32)
CREAT SERPL-MCNC: 0.77 MG/DL (ref 0.6–1.3)
DIFFERENTIAL METHOD BLD: ABNORMAL
EOSINOPHIL # BLD: 0.7 K/UL (ref 0–0.4)
EOSINOPHIL NFR BLD: 12 % (ref 0–5)
ERYTHROCYTE [DISTWIDTH] IN BLOOD BY AUTOMATED COUNT: 14.3 % (ref 11.6–14.5)
GLOBULIN SER CALC-MCNC: 4 G/DL (ref 2–4)
GLUCOSE SERPL-MCNC: 94 MG/DL (ref 74–99)
HCT VFR BLD AUTO: 37.8 % (ref 36–48)
HGB BLD-MCNC: 11.9 G/DL (ref 13–16)
IMM GRANULOCYTES # BLD AUTO: 0 K/UL (ref 0–0.04)
IMM GRANULOCYTES NFR BLD AUTO: 0 % (ref 0–0.5)
INR PPP: 2.2 (ref 0.8–1.2)
LYMPHOCYTES # BLD: 1.1 K/UL (ref 0.9–3.6)
LYMPHOCYTES NFR BLD: 20 % (ref 21–52)
MCH RBC QN AUTO: 27.4 PG (ref 24–34)
MCHC RBC AUTO-ENTMCNC: 31.5 G/DL (ref 31–37)
MCV RBC AUTO: 86.9 FL (ref 78–100)
MONOCYTES # BLD: 0.4 K/UL (ref 0.05–1.2)
MONOCYTES NFR BLD: 8 % (ref 3–10)
MYCOBACTERIUM SPEC QL CULT: NORMAL
NEUTS SEG # BLD: 3.1 K/UL (ref 1.8–8)
NEUTS SEG NFR BLD: 58 % (ref 40–73)
NRBC # BLD: 0 K/UL (ref 0–0.01)
NRBC BLD-RTO: 0 PER 100 WBC
PLATELET # BLD AUTO: 172 K/UL (ref 135–420)
PMV BLD AUTO: 9.8 FL (ref 9.2–11.8)
POTASSIUM SERPL-SCNC: 4.9 MMOL/L (ref 3.5–5.5)
PROT SERPL-MCNC: 7 G/DL (ref 6.4–8.2)
PROTHROMBIN TIME: 25.1 SEC (ref 11.5–15.2)
RBC # BLD AUTO: 4.35 M/UL (ref 4.35–5.65)
SODIUM SERPL-SCNC: 136 MMOL/L (ref 136–145)
SPECIMEN PREPARATION: NORMAL
SPECIMEN SOURCE: NORMAL
WBC # BLD AUTO: 5.4 K/UL (ref 4.6–13.2)

## 2022-09-25 PROCEDURE — 86480 TB TEST CELL IMMUN MEASURE: CPT

## 2022-09-25 PROCEDURE — 65270000029 HC RM PRIVATE

## 2022-09-25 PROCEDURE — 36415 COLL VENOUS BLD VENIPUNCTURE: CPT

## 2022-09-25 PROCEDURE — 76700 US EXAM ABDOM COMPLETE: CPT

## 2022-09-25 PROCEDURE — 85610 PROTHROMBIN TIME: CPT

## 2022-09-25 PROCEDURE — 74011250637 HC RX REV CODE- 250/637: Performed by: INTERNAL MEDICINE

## 2022-09-25 PROCEDURE — 85025 COMPLETE CBC W/AUTO DIFF WBC: CPT

## 2022-09-25 PROCEDURE — 80053 COMPREHEN METABOLIC PANEL: CPT

## 2022-09-25 RX ADMIN — WARFARIN SODIUM 4 MG: 3 TABLET ORAL at 17:19

## 2022-09-25 RX ADMIN — ALBUTEROL SULFATE 2 PUFF: 108 INHALANT RESPIRATORY (INHALATION) at 08:00

## 2022-09-25 RX ADMIN — ALBUTEROL SULFATE 2 PUFF: 108 INHALANT RESPIRATORY (INHALATION) at 22:20

## 2022-09-25 RX ADMIN — ALBUTEROL SULFATE 2 PUFF: 108 INHALANT RESPIRATORY (INHALATION) at 12:10

## 2022-09-25 RX ADMIN — ALBUTEROL SULFATE 2 PUFF: 108 INHALANT RESPIRATORY (INHALATION) at 04:00

## 2022-09-25 RX ADMIN — ALBUTEROL SULFATE 2 PUFF: 108 INHALANT RESPIRATORY (INHALATION) at 00:00

## 2022-09-25 RX ADMIN — ALBUTEROL SULFATE 2 PUFF: 108 INHALANT RESPIRATORY (INHALATION) at 17:19

## 2022-09-25 NOTE — PROGRESS NOTES
Pharmacy Dosing Services: Warfarin    Consult for Warfarin Dosing by Pharmacy by Dr. Lux Chau provided for this 75 y.o.  male , for indication of Pulmonary Embolism. Day of Therapy  - continuation of home med  Dose to achieve an INR goal of 2-3    Order entered for Warfarin 4 mg to be given today at 18:00. Significant drug interactions: none  LABS    PT/INR Lab Results   Component Value Date/Time    INR 2.2 (H) 09/25/2022 04:24 AM      Platelets Lab Results   Component Value Date/Time    PLATELET 092 63/00/3609 04:24 AM      H/H     Lab Results   Component Value Date/Time    HGB 11.9 (L) 09/25/2022 04:24 AM        Warfarin Administrations (last 168 hours)       Date/Time Action Medication Dose    09/24/22 1740 Given    warfarin (COUMADIN) tablet 5 mg 5 mg    09/23/22 1708 Given    warfarin (COUMADIN) tablet 4 mg 4 mg          Pharmacy to follow daily and will provide subsequent Warfarin dosing based on clinical status.   BALDEMAR Francois  Contact information [phone]

## 2022-09-25 NOTE — PROGRESS NOTES
Problem: Risk for Spread of Infection  Goal: Prevent transmission of infectious organism to others  Description: Prevent the transmission of infectious organisms to other patients, staff members, and visitors. Outcome: Progressing Towards Goal     Problem: Patient Education:  Go to Education Activity  Goal: Patient/Family Education  Outcome: Progressing Towards Goal     Problem: Falls - Risk of  Goal: *Absence of Falls  Description: Document Jony Sol Fall Risk and appropriate interventions in the flowsheet.   Outcome: Progressing Towards Goal  Note: Fall Risk Interventions:            Medication Interventions: Teach patient to arise slowly, Patient to call before getting OOB

## 2022-09-25 NOTE — PROGRESS NOTES
Problem: Risk for Spread of Infection  Goal: Prevent transmission of infectious organism to others  Description: Prevent the transmission of infectious organisms to other patients, staff members, and visitors. Outcome: Progressing Towards Goal     Problem: Patient Education:  Go to Education Activity  Goal: Patient/Family Education  Outcome: Progressing Towards Goal     Problem: Falls - Risk of  Goal: *Absence of Falls  Description: Document Rochelle Cox Fall Risk and appropriate interventions in the flowsheet.   Outcome: Progressing Towards Goal  Note: Fall Risk Interventions:            Medication Interventions: Teach patient to arise slowly, Patient to call before getting OOB

## 2022-09-25 NOTE — PROGRESS NOTES
Hospitalist Progress Note    Patient: Matt Jo MRN: [id number]  CSN: [id number]    YOB: 1955  Age: 75 y.o. 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 Dyspnea comfortable on room air add Pulmicort inhaler and albuterol  2. Gallbladder inflammation passive congestion await echo  3. Hilar adenopathy AFB smears x3 if nondiagnostic possible dark  4. Pulmonary embolism history continue Coumadin  5. Cholelithiasis not symptomatic could be passive congestion await echo results  6. Suspicious mole needs Derm follow-up  7.  Nodular liver check ultrasound hepatology consult on Monday hep profile neg  Patient Active Problem List   Diagnosis Code    Shortness of breath R06.02    Hilar adenopathy R59.0    Cough R05.9       Subjective:    I am doing well  inhaler helped    Review of systems:    Constitutional: denies fevers, chills, myalgias  Respiratory:+SOB, cough  Cardiovascular: denies chest pain, palpitations  Gastrointestinal: denies nausea, vomiting, diarrhea      Vital signs/Intake and Output:  Visit Vitals  /67 (BP 1 Location: Left upper arm, BP Patient Position: At rest;Lying)   Pulse 75   Temp 97.5 °F (36.4 °C)   Resp 20   Ht 5' 7\" (1.702 m)   Wt 113.4 kg (250 lb)   SpO2 97%   BMI 39.16 kg/m²     Current Shift:  09/25 0701 - 09/25 1900  In: 240 [P.O.:240]  Out: -   Last three shifts:  09/23 1901 - 09/25 0700  In: 1080 [P.O.:1080]  Out: -     Exam:    General: Well developed, alert, NAD, OX3  Head/Neck: NCAT, supple, No masses, No lymphadenopathy  CVS:Regular rate   Lungs:C cough with inspiration  Abdomen: Soft, Nontender, No distention, Normal Bowel sounds, No hepatomegaly  Extremities: No C/C/E, pulses palpable 2+ venous stasis changes on the left  Skin:n has a mole that I would like for him to get checked once discharge   Neuro:grossly normal , follows commands  Psych:appropriate                Labs: Results:       Chemistry Recent Labs     09/25/22 0424 09/24/22 0525 09/23/22 0147   GLU 94 95 103*    137 136   K 4.9 4.1 4.5    103 104   CO2 31 30 30   BUN 13 15 13   CREA 0.77 0.82 0.90   CA 9.1 9.0 9.4   AGAP 2* 4 2*   BUCR 17 18 14   AP 93 101 102   TP 7.0 7.4 7.8   ALB 3.0* 3.0* 3.1*   GLOB 4.0 4.4* 4.7*   AGRAT 0.8 0.7* 0.7*        CBC w/Diff Recent Labs     09/25/22 0424 09/24/22 0525 09/23/22 0147   WBC 5.4 6.1 6.0   RBC 4.35 4.45 4.70   HGB 11.9* 12.0* 12.6*   HCT 37.8 38.6 41.2    185 186   GRANS 58 60 58   LYMPH 20* 19* 22   EOS 12* 12* 12*        Cardiac Enzymes No results for input(s): CPK, CKND1, ARAVIND in the last 72 hours. No lab exists for component: CKRMB, TROIP   Coagulation Recent Labs     09/25/22 0424 09/24/22 0525   PTP 25.1* 26.4*   INR 2.2* 2.4*         Lipid Panel No results found for: CHOL, CHOLPOCT, CHOLX, CHLST, CHOLV, 795365, HDL, HDLP, LDL, LDLC, DLDLP, 748214, VLDLC, VLDL, TGLX, TRIGL, TRIGP, TGLPOCT, CHHD, CHHDX   BNP No results for input(s): BNPP in the last 72 hours.    Liver Enzymes Recent Labs     09/25/22 0424   TP 7.0   ALB 3.0*   AP 93        Thyroid Studies Lab Results   Component Value Date/Time    TSH 0.67 09/24/2022 05:25 AM        Procedures/imaging: see electronic medical records for all procedures/Xrays and details which were not copied into this note but were reviewed prior to creation of Horace Arenas MD

## 2022-09-25 NOTE — PROGRESS NOTES
Problem: Risk for Spread of Infection  Goal: Prevent transmission of infectious organism to others  Description: Prevent the transmission of infectious organisms to other patients, staff members, and visitors. Outcome: Progressing Towards Goal     Problem: Patient Education:  Go to Education Activity  Goal: Patient/Family Education  Outcome: Progressing Towards Goal     Problem: Falls - Risk of  Goal: *Absence of Falls  Description: Document Gonzalo Loja Fall Risk and appropriate interventions in the flowsheet.   Outcome: Progressing Towards Goal  Note: Fall Risk Interventions:            Medication Interventions: Teach patient to arise slowly, Patient to call before getting OOB

## 2022-09-26 LAB
ACE SERPL-CCNC: 58 U/L (ref 14–82)
ACID FAST STN SPEC: NEGATIVE
ALBUMIN SERPL-MCNC: 3 G/DL (ref 3.4–5)
ALBUMIN/GLOB SERPL: 0.7 {RATIO} (ref 0.8–1.7)
ALP SERPL-CCNC: 99 U/L (ref 45–117)
ALT SERPL-CCNC: 25 U/L (ref 16–61)
ANION GAP SERPL CALC-SCNC: 5 MMOL/L (ref 3–18)
AST SERPL-CCNC: 25 U/L (ref 10–38)
B PERT IGG SER-ACNC: 1.94 INDEX (ref 0–0.94)
B PERT IGM SER QL IA: <1 INDEX (ref 0–0.9)
BASOPHILS # BLD: 0 K/UL (ref 0–0.1)
BASOPHILS NFR BLD: 1 % (ref 0–2)
BILIRUB SERPL-MCNC: 0.6 MG/DL (ref 0.2–1)
BUN SERPL-MCNC: 13 MG/DL (ref 7–18)
BUN/CREAT SERPL: 18 (ref 12–20)
C BURNET PH1 IGG SER-ACNC: NEGATIVE
C BURNET PH2 IGG SER-ACNC: NEGATIVE
CALCIUM SERPL-MCNC: 9.1 MG/DL (ref 8.5–10.1)
CENTROMERE B AB SER-ACNC: <0.2 AI (ref 0–0.9)
CHLORIDE SERPL-SCNC: 101 MMOL/L (ref 100–111)
CHROMATIN AB SERPL-ACNC: <0.2 AI (ref 0–0.9)
CO2 SERPL-SCNC: 30 MMOL/L (ref 21–32)
CREAT SERPL-MCNC: 0.72 MG/DL (ref 0.6–1.3)
DIFFERENTIAL METHOD BLD: ABNORMAL
DSDNA AB SER-ACNC: 1 IU/ML (ref 0–9)
ENA JO1 AB SER-ACNC: <0.2 AI (ref 0–0.9)
ENA RNP AB SER-ACNC: <0.2 AI (ref 0–0.9)
ENA SCL70 AB SER-ACNC: <0.2 AI (ref 0–0.9)
ENA SM AB SER-ACNC: <0.2 AI (ref 0–0.9)
ENA SM+RNP AB SER-ACNC: <0.2 AI (ref 0–0.9)
ENA SS-A AB SER-ACNC: <0.2 AI (ref 0–0.9)
ENA SS-B AB SER-ACNC: <0.2 AI (ref 0–0.9)
EOSINOPHIL # BLD: 0.6 K/UL (ref 0–0.4)
EOSINOPHIL NFR BLD: 12 % (ref 0–5)
ERYTHROCYTE [DISTWIDTH] IN BLOOD BY AUTOMATED COUNT: 14.3 % (ref 11.6–14.5)
GLOBULIN SER CALC-MCNC: 4.3 G/DL (ref 2–4)
GLUCOSE SERPL-MCNC: 92 MG/DL (ref 74–99)
HCT VFR BLD AUTO: 38.9 % (ref 36–48)
HGB BLD-MCNC: 12.2 G/DL (ref 13–16)
IMM GRANULOCYTES # BLD AUTO: 0 K/UL (ref 0–0.04)
IMM GRANULOCYTES NFR BLD AUTO: 1 % (ref 0–0.5)
INR PPP: 2.5 (ref 0.8–1.2)
LYMPHOCYTES # BLD: 1.1 K/UL (ref 0.9–3.6)
LYMPHOCYTES NFR BLD: 20 % (ref 21–52)
MCH RBC QN AUTO: 27.2 PG (ref 24–34)
MCHC RBC AUTO-ENTMCNC: 31.4 G/DL (ref 31–37)
MCV RBC AUTO: 86.8 FL (ref 78–100)
MONOCYTES # BLD: 0.4 K/UL (ref 0.05–1.2)
MONOCYTES NFR BLD: 8 % (ref 3–10)
MYCOBACTERIUM SPEC QL CULT: NORMAL
NEUTS SEG # BLD: 3.1 K/UL (ref 1.8–8)
NEUTS SEG NFR BLD: 58 % (ref 40–73)
NRBC # BLD: 0 K/UL (ref 0–0.01)
NRBC BLD-RTO: 0 PER 100 WBC
PLATELET # BLD AUTO: 181 K/UL (ref 135–420)
PMV BLD AUTO: 10 FL (ref 9.2–11.8)
POTASSIUM SERPL-SCNC: 4.4 MMOL/L (ref 3.5–5.5)
PROT SERPL-MCNC: 7.3 G/DL (ref 6.4–8.2)
PROTHROMBIN TIME: 27.6 SEC (ref 11.5–15.2)
RBC # BLD AUTO: 4.48 M/UL (ref 4.35–5.65)
RIBOSOMAL P AB SER-ACNC: <0.2 AI (ref 0–0.9)
SEE BELOW:, 164879: NORMAL
SODIUM SERPL-SCNC: 136 MMOL/L (ref 136–145)
SPECIMEN PREPARATION: NORMAL
SPECIMEN SOURCE: NORMAL
WBC # BLD AUTO: 5.3 K/UL (ref 4.6–13.2)

## 2022-09-26 PROCEDURE — 85025 COMPLETE CBC W/AUTO DIFF WBC: CPT

## 2022-09-26 PROCEDURE — 65270000029 HC RM PRIVATE

## 2022-09-26 PROCEDURE — 80053 COMPREHEN METABOLIC PANEL: CPT

## 2022-09-26 PROCEDURE — 85610 PROTHROMBIN TIME: CPT

## 2022-09-26 PROCEDURE — 36415 COLL VENOUS BLD VENIPUNCTURE: CPT

## 2022-09-26 PROCEDURE — 74011250637 HC RX REV CODE- 250/637: Performed by: INTERNAL MEDICINE

## 2022-09-26 RX ADMIN — ALBUTEROL SULFATE 2 PUFF: 108 INHALANT RESPIRATORY (INHALATION) at 00:28

## 2022-09-26 RX ADMIN — ALBUTEROL SULFATE 2 PUFF: 108 INHALANT RESPIRATORY (INHALATION) at 09:04

## 2022-09-26 RX ADMIN — ALBUTEROL SULFATE 2 PUFF: 108 INHALANT RESPIRATORY (INHALATION) at 13:16

## 2022-09-26 RX ADMIN — ALBUTEROL SULFATE 2 PUFF: 108 INHALANT RESPIRATORY (INHALATION) at 18:03

## 2022-09-26 RX ADMIN — ALBUTEROL SULFATE 2 PUFF: 108 INHALANT RESPIRATORY (INHALATION) at 21:06

## 2022-09-26 RX ADMIN — ALBUTEROL SULFATE 2 PUFF: 108 INHALANT RESPIRATORY (INHALATION) at 05:04

## 2022-09-26 NOTE — CONSULTS
Pulmonary and Sleep Medicine     Pulmonary Note    Patient: Ambar Xavier               Sex: male          DOA: 9/22/2022       YOB: 1955      Age:  75 y.o.        LOS:  LOS: 4 days        Reason for Consult: lung nodules    IMPRESSION:   Multiple pulmonary nodules - R91.8  OSKAR - G47.33  Hx of PE   RECOMMENDATIONS:   Compensated respiratory status; monitor for goal SPO2> 91%  Sputum culture and AFB smear and culture  AFB smearx3 negative safe to do bronch tomorrow  Agree with ID for HIV test, Q TB test, parasite blood exam, echo  I will check MYRNA, ANCA, ACE, RF, CCP  Any indication for bronchoscopy based on clinical course and inability to collect respiratory samples for culture and AFB smear  He is on Coumadin and reversal may be required  May use bronchodilators for wheezing, pulmonary hygiene care  Monitor off of antibiotic  Aspiration prevention bundle, head of the bed at 30' all times  May use CPAP at night to sleep with once AFB negative  Glycemic control  Stress ulcer and DVT prophylaxis  Discussed care plan with patient, Sydney Torres, [place]  Will defer respective systems problem management to primary and other consultant and follow patient with primary and other team  Further recommendations will be based on the patient's response to recommended treatment and results of the investigation ordered. [x]Total care time exclusive of procedures 65 minutes with complex decision making performed and > 50% time spent in face to face consultation. HPI:   Mr. Ambar Xavier is a 75 y.o. male who is known to my associate Dr. Brie Go as he came this year in our office for evaluation for exertional dyspnea symptoms. The patient has prior history of pulmonary embolism. He had massive pulmonary embolism in July 2007 following travel history, and was placed on chronic anticoagulation therapy. Patient denies any prior history of asthma or COPD.   He reports prior sleep study being negative for sleep apnea in 2007. Patient has prior COVID infections in the last 2 years-December 2020, and December 2021. Patient also took [address] vaccinations- March 2021, April 2021, and subsequently booster dose in April 2022. Following COVID vaccination booster dose in April 2022, patient developed flu-like symptoms with fever, shortness of breath, fatigue. Subsequently, he has traveled to [facility]. Patient saw his PCP April 2022. Reports of dyspnea on exertion, cough and phlegm, weight loss of 50 lbs since April 2022 and was found to be hypoxemic during walk test with PCP  He was placed on home oxygen therapy. He had CT chest done May 2022 [place] which was negative for PEs-while noted with multiple lung nodules. Patient on chronic coumadin therapy for history of PEs. Eventually on repeat 6 min walk test during 07/2022 in our office he came off of O2. A follow up CT chest 09/20/22 at [facility] showed worsening in lung nodules leading to recommendation of hospitalization for work up of Tb from Dr. Denae Acosta. He reports long back PPD that was negative. Wife is asymptomatic. In past several months, he denies any night sweat, fevers, chills, adenopathy, chest pain, hemoptysis, lightheadedness, palpitations, syncope, orthopnea, paroxysmal nocturnal dyspnea.      9/26/2022  WBC normal  AFB x3 smear negative  Keep precautions and bronch in AM tomoorw  Npo after midnight  Hold coumadin    Review of Systems  General ROS: negative for  - fever, chills, malaise  Psychological ROS: negative for - anxiety or depression  Ophthalmic ROS: negative for - eye pain, loss of vision or photophobia  ENT ROS: negative for - epistaxis, headaches, sinus pain or vocal changes  Allergy and Immunology ROS: negative for - hives or postnasal drip  Hematological and Lymphatic ROS: negative for - bleeding problems, blood clots, jaundice, pallor or swollen lymph nodes  Endocrine ROS: negative for - skin changes, temperature intolerance  Cardiovascular ROS: negative for - chest pain, murmur, orthopnea, palpitations or pnd  Gastrointestinal ROS: no nausea, vomiting, abdominal pain, change in bowel habits, or black or bloody stools  Genito-Urinary ROS: no dysuria, trouble voiding, or hematuria  Musculoskeletal ROS: negative for - muscle pain or muscular weakness  Neurological ROS: no TIA or stroke symptoms  Dermatological ROS: negative for - pruritus, rash or skin lesion changes     Past Medical History  Past Medical History:   Diagnosis Date    COVID     Hypoxemia     Pulmonary embolism (HCC)        Past Surgical History  History reviewed. No pertinent surgical history. Family History  History reviewed. No pertinent family history. Social History  Social History     Tobacco Use    Smoking status: Never    Smokeless tobacco: Never        Medications  Current Facility-Administered Medications   Medication Dose Route Frequency    albuterol (PROVENTIL HFA, VENTOLIN HFA, PROAIR HFA) inhaler 2 Puff  2 Puff Inhalation Q4H PRN    albuterol (PROVENTIL HFA, VENTOLIN HFA, PROAIR HFA) inhaler 2 Puff  2 Puff Inhalation Q4H     Prior to Admission medications    Medication Sig Start Date End Date Taking? Authorizing Provider   warfarin (COUMADIN) 4 mg tablet Take 4 mg by mouth every other day. Yes Provider, Historical   warfarin (COUMADIN) 5 mg tablet Take 5 mg by mouth every other day. Yes Provider, Historical       Allergy  No Known Allergies    Physical Exam:   Vital Signs:    Blood pressure 118/78, pulse 84, temperature 99 °F (37.2 °C), resp. rate 18, height 5' 7\" (1.702 m), weight 113.4 kg (250 lb), SpO2 94 %. Body mass index is 39.16 kg/m². O2 Device: None (Room air)       Temp (24hrs), Av.9 °F (36.6 °C), Min:96.8 °F (36 °C), Max:99 °F (37.2 °C)       Intake/Output:   Last shift:      No intake/output data recorded.   Last 3 shifts: 1901 -  0700  In: 240 [P.O.:240]  Out: -   No intake or output data in the 24 hours ending 09/26/22 1604   General: in no respiratory distress and acyanotic, alert, and oriented times 3, no distress, moderately obese, on room air  HEENT: PERRLA, EOMI, fundi benign, throat normal without erythema or exudate  Neck: No abnormally enlarged lymph nodes or thyroid, supple  Chest: obese chest wall  Lungs: moderate air entry; few rhonchi bl scattered, no wheezes bilaterally, normal percussion bilaterally, no tenderness/ rash; exam limitation secondary to body habitus  Heart: Regular rate and rhythm, S1S2 present, or without murmur or extra heart sounds  Abdomen: obese, bowel sounds normoactive, tympanic, abdomen is soft without significant tenderness, masses, organomegaly or guarding  Extremity: bl pedal edema, no cyanosis, peripheral pulses 2+ and symmetric  Neuro: alert, oriented x 3, no defects noted in general exam.  Skin: Skin color, texture, turgor normal. No rashes or lesions    Labs Reviewed:  Recent Results (from the past 24 hour(s))   PROTHROMBIN TIME + INR    Collection Time: 09/26/22  4:41 AM   Result Value Ref Range    Prothrombin time 27.6 (H) 11.5 - 15.2 sec    INR 2.5 (H) 0.8 - 1.2     CBC WITH AUTOMATED DIFF    Collection Time: 09/26/22  4:41 AM   Result Value Ref Range    WBC 5.3 4.6 - 13.2 K/uL    RBC 4.48 4.35 - 5.65 M/uL    HGB 12.2 (L) 13.0 - 16.0 g/dL    HCT 38.9 36.0 - 48.0 %    MCV 86.8 78.0 - 100.0 FL    MCH 27.2 24.0 - 34.0 PG    MCHC 31.4 31.0 - 37.0 g/dL    RDW 14.3 11.6 - 14.5 %    PLATELET 877 910 - 971 K/uL    MPV 10.0 9.2 - 11.8 FL    NRBC 0.0 0  WBC    ABSOLUTE NRBC 0.00 0.00 - 0.01 K/uL    NEUTROPHILS 58 40 - 73 %    LYMPHOCYTES 20 (L) 21 - 52 %    MONOCYTES 8 3 - 10 %    EOSINOPHILS 12 (H) 0 - 5 %    BASOPHILS 1 0 - 2 %    IMMATURE GRANULOCYTES 1 (H) 0.0 - 0.5 %    ABS. NEUTROPHILS 3.1 1.8 - 8.0 K/UL    ABS. LYMPHOCYTES 1.1 0.9 - 3.6 K/UL    ABS. MONOCYTES 0.4 0.05 - 1.2 K/UL    ABS. EOSINOPHILS 0.6 (H) 0.0 - 0.4 K/UL    ABS. BASOPHILS 0.0 0.0 - 0.1 K/UL    ABS. VASYL Mi. 0.0 0.00 - 0.04 K/UL    DF AUTOMATED     METABOLIC PANEL, COMPREHENSIVE    Collection Time: 09/26/22  4:41 AM   Result Value Ref Range    Sodium 136 136 - 145 mmol/L    Potassium 4.4 3.5 - 5.5 mmol/L    Chloride 101 100 - 111 mmol/L    CO2 30 21 - 32 mmol/L    Anion gap 5 3.0 - 18 mmol/L    Glucose 92 74 - 99 mg/dL    BUN 13 7.0 - 18 MG/DL    Creatinine 0.72 0.6 - 1.3 MG/DL    BUN/Creatinine ratio 18 12 - 20      GFR est AA >60 >60 ml/min/1.73m2    GFR est non-AA >60 >60 ml/min/1.73m2    Calcium 9.1 8.5 - 10.1 MG/DL    Bilirubin, total 0.6 0.2 - 1.0 MG/DL    ALT (SGPT) 25 16 - 61 U/L    AST (SGOT) 25 10 - 38 U/L    Alk. phosphatase 99 45 - 117 U/L    Protein, total 7.3 6.4 - 8.2 g/dL    Albumin 3.0 (L) 3.4 - 5.0 g/dL    Globulin 4.3 (H) 2.0 - 4.0 g/dL    A-G Ratio 0.7 (L) 0.8 - 1.7             No results for input(s): FIO2I, IFO2, HCO3I, IHCO3, HCOPOC, PCO2I, PCOPOC, IPHI, PHI, PHPOC, PO2I, PO2POC in the last 72 hours.     No lab exists for component: IPOC2      All Micro Results       Procedure Component Value Units Date/Time    AFB CULTURE + SMEAR W/RFLX ID FROM CULTURE [[id number]] Collected: 09/23/22 0615    Order Status: Completed Specimen: Miscellaneous sample Updated: 09/26/22 1537     Source SPUTUM        AFB Specimen processing Concentration     Acid Fast Smear Negative        Comment: (NOTE)  Performed At: [facility] & [address] [id number]  Coby Marmolejo MD JF:[id number]          Acid Fast Culture PENDING    CULTURE, BLOOD [[id number]] Collected: 09/22/22 1445    Order Status: Completed Specimen: Blood Updated: 09/26/22 0722     Special Requests: NO SPECIAL REQUESTS        Culture result: NO GROWTH 4 DAYS       CULTURE, BLOOD 2nd DRAW (required for [facility]) [[id number]] Collected: 09/22/22 1445    Order Status: Completed Specimen: Blood Updated: 09/26/22 0722     Special Requests: NO SPECIAL REQUESTS        Culture result: NO GROWTH 4 DAYS       AFB CULTURE + SMEAR W/RFLX ID FROM CULTURE [[id number]] Collected: 09/24/22 0903    Order Status: Completed Specimen: Miscellaneous sample Updated: 09/25/22 1636     Source SPUTUM        AFB Specimen processing Concentration     Acid Fast Smear Negative        Comment: (NOTE)  Performed At: [facility] & [address] [id number]  Rebecca Aleman MD IW:[id number]          Acid Fast Culture PENDING    AFB CULTURE + SMEAR W/RFLX ID FROM CULTURE [[id number]] Collected: 09/23/22 1650    Order Status: Completed Specimen: Miscellaneous sample Updated: 09/24/22 1636     Source SPUTUM        AFB Specimen processing Concentration     Acid Fast Smear Negative        Comment: (NOTE)  Performed At: [facility] & [address] [id number]  Rebecca Aleman MD MJ:[id number]          Acid Fast Culture PENDING    ACID FAST SMEAR [[id number]] Collected: 09/23/22 1650    Order Status: Canceled     CRYPTOCOCCUS AG W/REFLEX TITER [[id number]] Collected: 09/23/22 0800    Order Status: Completed Specimen: Blood Updated: 09/23/22 1613     Cryptococcus Ag Negative       RESPIRATORY VIRUS PANEL W/COVID-19, PCR [[id number]] Collected: 09/23/22 1600    Order Status: Canceled Specimen: NASOPHARYNGEAL SWAB     AFB CULTURE + SMEAR W/RFLX ID FROM CULTURE [[id number]] Collected: 09/23/22 0800    Order Status: Canceled     AFB CULTURE + SMEAR W/RFLX ID FROM CULTURE [[id number]]     Order Status: Canceled     AFB CULTURE + SMEAR W/RFLX ID FROM CULTURE [[id number]]     Order Status: Canceled     OVA & PARASITES, STOOL [[id number]]     Order Status: Canceled Specimen: Feces from Stool     CRYPTOCOCCUS AG [address] [[id number]] Collected: 09/22/22 2000    Order Status: Canceled Specimen: Serum from Blood                 Imaging:  [x]I have personally reviewed the patients chest radiographs images and report with the patient  Results from Hospital Encounter encounter on 09/22/22    XR CHEST PORT    Narrative  EXAM: XR CHEST PORT    CLINICAL INDICATION/HISTORY: baseline CXR prior to treatment  -Additional: None    COMPARISON: CT 9/23/2022    TECHNIQUE: Frontal view of the chest    _______________    FINDINGS:    HEART AND MEDIASTINUM: The cardiac size is normal. There is tortuosity of the  thoracic aorta. LUNGS AND PLEURAL SPACES: Lungs are mildly underexpanded. There is no focal  pneumonic consolidation. No evidence of pneumothorax or pleural effusion. BONY THORAX AND SOFT TISSUES: No acute osseous abnormality. Monitoring device  projects over the left upper chest wall.    _______________    Impression  Mildly underexpanded lungs without superimposed acute radiographic  cardiopulmonary abnormality. Results from Hospital Encounter encounter on 09/22/22    CTA CHEST ABD PELV W CONT    Narrative  EXAM: CTA of the chest, abdomen, and pelvis    CLINICAL INDICATION/HISTORY: Fever, weight loss, hypoxemia    COMPARISON: Outside chest CT report dated 9/20/2022. Images not available for  comparison. TECHNIQUE: Helical CT imaging of the chest initially performed without  intravenous contrast. Helical CT imaging of the chest, abdomen, and pelvis was  then performed with intravenous contrast. Maximum intensity projections were  generated. One or more dose reduction techniques were used on this CT: automated  exposure control, adjustment of the mAs and/or kVp according to patient size,  and iterative reconstruction techniques. The specific techniques used on this  CT exam have been documented in the patient's electronic medical record. Digital  Imaging and Communications in Medicine (DICOM) format image data are available  to nonaffiliated external healthcare facilities or entities on a secured, media  free, reciprocally searchable basis with patient authorization for at least a  12-month period after this study.       _______________    FINDINGS:    VASCULAR: No intramural hematoma throughout the thoracic aorta on noncontrast  imaging. Mild atherosclerotic calcifications. No aortic aneurysm or dissection. No pulmonary embolism. CHEST:    LUNGS: Numerous bilateral pulmonary nodules identified measuring less than 6 mm  in size. Most of these are in a centrilobular pattern. No dense consolidation. AIRWAY: Scattered secretions in the airway. PLEURA: Normal, with no effusion or pneumothorax. MEDIASTINUM: Normal heart size. No pericardial effusion. LYMPH NODES: Mildly enlarged hilar lymph nodes which are likely reactive. No  bulky lymphadenopathy in the chest.    OTHER: None.    ===============    ABDOMEN/PELVIS:    LIVER, BILIARY: Mild lobular contours of the liver. Subcentimeter hypodensity in  the inferior right hepatic lobe is too small to characterize. No biliary  dilation. Cholelithiasis with mild gallbladder wall thickening. PANCREAS: Unremarkable. SPLEEN: Unremarkable. ADRENALS: Unremarkable. KIDNEYS/URETERS/BLADDER: Peripelvic renal cysts which do not require follow-up  imaging. Left renal calculi. No ureteral calculi or hydronephrosis. No  suspicious renal mass. PELVIC ORGANS: Unremarkable. LYMPH NODES: No enlarged lymph nodes. GASTROINTESTINAL TRACT: No bowel dilation or wall thickening. Colonic  diverticulosis with no evidence of diverticulitis. The appendix is normal.    BONES: No acute or aggressive osseous abnormalities identified. OTHER: Fat-containing umbilical hernia.    _______________    Impression  1. No aortic aneurysm or dissection. No pulmonary embolism. 2. Numerous bilateral pulmonary nodules which are likely infectious or  inflammatory in etiology. Recommend follow-up chest CT in 2-3 months after  treatment to assure resolution. 3. Subtle nodular contours of the liver raising the possibility of chronic liver  disease.     4. Cholelithiasis with diffuse gallbladder wall thickening which could relate to  acute or chronic cholecystitis versus passive congestion.        [x]See my orders for details            Nahed Mosley MD

## 2022-09-26 NOTE — PROGRESS NOTES
For diagnositc bronchoscopy tomorrow    Send off:  Cell count and diff  Bacterial- aerobic/anaerobic culture  Fungal stain and culture  AFB stain and culture  X-pert MTB/RIF PCR- X2-- 1 to routine lab, and 2nd to health dpeartment  Cytology    DW Dr Marcial Alejandro MD  [facility])  Office #:     [phone]-MRSPEI [phone]   Office Fax: [phone]

## 2022-09-26 NOTE — PROGRESS NOTES
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   --Numerous bilateral pulmonary nodules which are likely infectious or inflammatory in etiology on CT chest done 9/20/22( no mediastinal/ hilar/axillar LAP noted). Lesions have progressed since last CT 5/20/22 . High on  DDX would be Pul TB/ Miliary TB- other fungal chronic infections Vs non  infectiout etiology such as Connetive tissue ds or maliagnancy possible. 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 Quantiferon TB pending  --HIV 1/2/0- Non reactive, Crypto ag neg, Hep B Ag-neg, hep C ab-- negative  --HB A1C= 5.2%  Weight loss and Fatigue  CTA chest /ABD/Pelvis-- 9/23--pul nodules, chronic liver disease, cholelithiasis   Elevated ER/CRP- 61/5.2 respectively  Morbidly Obese  History of COVID 19 Infection X2. 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 Cough same.  No SOB/F/C.  NO acute event over weekend. Notes/Labs/Imaging reports reviewed    Shortness of breath [R06.02]  History reviewed. No pertinent surgical history. History reviewed. No pertinent family history. Medications reviewed as below:   Current Facility-Administered Medications   Medication Dose Route Frequency Provider Last Rate Last Admin    albuterol (PROVENTIL HFA, VENTOLIN HFA, PROAIR HFA) inhaler 2 Puff  2 Puff Inhalation Q4H PRN Rafa Torres MD        albuterol (PROVENTIL HFA, VENTOLIN HFA, PROAIR HFA) inhaler 2 Puff  2 Puff Inhalation Q4H Rafa Torres MD   2 Puff at 09/26/22 0504    Warfarin - Pharmacy to Dose  1 Each Other Rx Dosing/Monitoring Rafa Torres MD         No Known Allergies  Social History     Socioeconomic History    Marital status:      Spouse name: Not on file    Number of children: Not on file    Years of education: Not on file    Highest education level: Not on file   Occupational History    Not on file   Tobacco Use    Smoking status: Never    Smokeless tobacco: Never   Substance and Sexual Activity    Alcohol use: Not on file    Drug use: Not on file    Sexual activity: Not on file   Other Topics Concern    Not on file   Social History Narrative    Not on file     Social Determinants of Health     Financial Resource Strain: Not on file   Food Insecurity: Not on file   Transportation Needs: Not on file   Physical Activity: Not on file   Stress: Not on file   Social Connections: Not on file   Intimate Partner Violence: Not on file   Housing Stability: Not on file        Review of Systems    Negative Unless BOLDED    General: fevers, chills, myalgias, arthralgias, weight loss, malaise, fatigue.   HEENT:  headaches,sinus pain or presure, recent URI, recent dental procedures;  tinnitus, hearing loss , visual changes, catarats, dizziness or blurred vision  PUlMONARY:  cough , shortness of breath, sputum production, hx of asthma or COPD. previous treatement for TB or PPD.        Objective:      Visit Vitals  /76   Pulse 84   Temp 96.8 °F (36 °C)   Resp 18   Ht 5' 7\" (1.702 m)   Wt 113.4 kg (250 lb)   SpO2 92%   BMI 39.16 kg/m²     Temp (24hrs), Av.7 °F (36.5 °C), Min:96.8 °F (36 °C), Max:98.1 °F (36.7 °C)      Lines / Catheters:   PIV    General:   awake alert and oriented- pleasant affect   Skin:   no rashes or skin lesions noted on limited exam  Pigmented skin- left leg from DVT   HEENT:  Normocephalic, atraumatic,no scleral icterus . Dentition good. Neck supple, no bruits. Lungs:   non-labored   Heart:  RRR   Abdomen:  soft,, obese. Non-tender   Genitourinary:  deferred   Extremities:   no clubbing, cyanosis; Neurologic:  No gross focal sensory abnormalities; 5/5 muscle strength to upper and lower extremities. Speech appropirate. Cranial nerves intact   Psychiatric:   appropriate and interactive.      Results       Procedure Component Value Units Date/Time    AFB CULTURE + SMEAR W/RFLX ID FROM CULTURE [[id number]] Collected: 22 0903    Order Status: Completed Specimen: Miscellaneous sample Updated: 22 163     Source SPUTUM        AFB Specimen processing Concentration     Acid Fast Smear Negative        Comment: (NOTE)  Performed At: [facility] & [address] [id number]  Quique Luong MD ZS:[id number]          Acid Fast Culture PENDING    ACID FAST SMEAR [[id number]] Collected: 22    Order Status: Canceled     AFB CULTURE + SMEAR W/RFLX ID FROM CULTURE [[id number]] Collected: 22    Order Status: Completed Specimen: Miscellaneous sample Updated: 22 163     Source SPUTUM        AFB Specimen processing Concentration     Acid Fast Smear Negative        Comment: (NOTE)  Performed At: [facility] & [address] [id number]  Quique Luong MD VH:[id number]          Acid Fast Culture PENDING    RESPIRATORY VIRUS PANEL W/COVID-19, PCR [[id number]] Collected: 22 1600 Order Status: Canceled Specimen: NASOPHARYNGEAL SWAB     CRYPTOCOCCUS AG W/REFLEX TITER [[id number]] Collected: 09/23/22 0800    Order Status: Completed Specimen: Blood Updated: 09/23/22 1613     Cryptococcus Ag Negative       AFB CULTURE + SMEAR W/RFLX ID FROM CULTURE [[id number]] Collected: 09/23/22 0800    Order Status: Canceled     AFB CULTURE + SMEAR W/RFLX ID FROM CULTURE [[id number]] Collected: 09/23/22 0615    Order Status: Completed Specimen: Miscellaneous sample Updated: 09/24/22 1636     Source SPUTUM        AFB Specimen processing Concentration     Acid Fast Smear Negative        Comment: (NOTE)  Performed At: [facility] & [address] [id number]  Renate Hobbs MD PF:[id number]          Acid Fast Culture PENDING    AFB CULTURE + SMEAR W/RFLX ID FROM CULTURE [[id number]]     Order Status: Canceled     AFB CULTURE + SMEAR W/RFLX ID FROM CULTURE [[id number]]     Order Status: Canceled     OVA & PARASITES, STOOL [[id number]]     Order Status: Canceled Specimen: Feces from [place] [address] [[id number]] Collected: 09/22/22 2000    Order Status: Canceled Specimen: Serum from Blood     CULTURE, BLOOD [[id number]] Collected: 09/22/22 1445    Order Status: Completed Specimen: Blood Updated: 09/26/22 0722     Special Requests: NO SPECIAL REQUESTS        Culture result: NO GROWTH 4 DAYS       CULTURE, BLOOD 2nd DRAW (required for [facility]) [[id number]] Collected: 09/22/22 1445    Order Status: Completed Specimen: Blood Updated: 09/26/22 0722     Special Requests: NO SPECIAL REQUESTS        Culture result: NO GROWTH 4 DAYS               Labs: Results:   Chemistry Recent Labs     09/26/22  0441 09/25/22  0424 09/24/22  0525   GLU 92 94 95    136 137   K 4.4 4.9 4.1    103 103   CO2 30 31 30   BUN 13 13 15   CREA 0.72 0.77 0.82   CA 9.1 9.1 9.0   AGAP 5 2* 4   BUCR 18 17 18   AP 99 93 101   TP 7.3 7.0 7.4   ALB 3.0* 3.0* 3.0*   GLOB 4.3* 4.0 4.4*   AGRAT 0.7* 0.8 0.7*      CBC w/Diff Recent Labs     09/26/22  0441 09/25/22  0424 09/24/22  0525   WBC 5.3 5.4 6.1   RBC 4.48 4.35 4.45   HGB 12.2* 11.9* 12.0*   HCT 38.9 37.8 38.6    172 185   GRANS 58 58 60   LYMPH 20* 20* 19*   EOS 12* 12* 12*            Imaging:      All imaging reviewed from Admission to present as per radiology interpretation in [facility]

## 2022-09-26 NOTE — PROGRESS NOTES
Hospitalist Progress Note    Patient: Chinedu Nascimento MRN: [id number]  CSN: [id number]    YOB: 1955  Age: 75 y.o. 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 Dyspnea comfortable on room air for bronch if smear remain neg tomorrow and inr ok pulmonary stopped coumadin   2. Gallbladder inflammation passive congestion no symptoms  3. Hilar adenopathy AFB smears x3 if nondiagnostic possible bronch in am pending INR  4. Pulmonary embolism history hold Coumadin for bronch   5. Cholelithiasis not symptomatic could be passive congestion  6. Suspicious mole needs Derm follow-up  7. Nodular liver check ultrasound hepatology consult on Monday hep profile neg    Patient Active Problem List   Diagnosis Code    Shortness of breath R06.02    Hilar adenopathy R59.0    Cough R05.9       Subjective:    I am doing well      Review of systems:    Constitutional: denies fevers, chills, myalgias  Respiratory:+SOB, cough  Cardiovascular: denies chest pain, palpitations  Gastrointestinal: denies nausea, vomiting, diarrhea      Vital signs/Intake and Output:  Visit Vitals  /78   Pulse 84   Temp 99 °F (37.2 °C)   Resp 18   Ht 5' 7\" (1.702 m)   Wt 113.4 kg (250 lb)   SpO2 94%   BMI 39.16 kg/m²     Current Shift:  No intake/output data recorded.   Last three shifts:  09/24 1901 - 09/26 0700  In: 240 [P.O.:240]  Out: -     Exam:    General: Well developed, alert, NAD, OX3  Head/Neck: NCAT, supple, No masses, No lymphadenopathy  CVS:Regular rate   Lungs:C cough with inspiration  Abdomen: Soft, Nontender, No distention, Normal Bowel sounds, No hepatomegaly  Extremities: No C/C/E, pulses palpable 2+ venous stasis changes on the left  Skin:n has a mole that I would like for him to get checked once discharge   Neuro:grossly normal , follows commands  Psych:appropriate Labs: Results:       Chemistry Recent Labs     09/26/22 0441 09/25/22 0424 09/24/22  0525   GLU 92 94 95    136 137   K 4.4 4.9 4.1    103 103   CO2 30 31 30   BUN 13 13 15   CREA 0.72 0.77 0.82   CA 9.1 9.1 9.0   AGAP 5 2* 4   BUCR 18 17 18   AP 99 93 101   TP 7.3 7.0 7.4   ALB 3.0* 3.0* 3.0*   GLOB 4.3* 4.0 4.4*   AGRAT 0.7* 0.8 0.7*        CBC w/Diff Recent Labs     09/26/22 0441 09/25/22 0424 09/24/22  0525   WBC 5.3 5.4 6.1   RBC 4.48 4.35 4.45   HGB 12.2* 11.9* 12.0*   HCT 38.9 37.8 38.6    172 185   GRANS 58 58 60   LYMPH 20* 20* 19*   EOS 12* 12* 12*        Cardiac Enzymes No results for input(s): CPK, CKND1, ARAVIND in the last 72 hours. No lab exists for component: CKRMB, TROIP   Coagulation Recent Labs     09/26/22 0441 09/25/22 0424   PTP 27.6* 25.1*   INR 2.5* 2.2*         Lipid Panel No results found for: CHOL, CHOLPOCT, CHOLX, CHLST, CHOLV, 074676, HDL, HDLP, LDL, LDLC, DLDLP, 931643, VLDLC, VLDL, TGLX, TRIGL, TRIGP, TGLPOCT, CHHD, CHHDX   BNP No results for input(s): BNPP in the last 72 hours.    Liver Enzymes Recent Labs     09/26/22 0441   TP 7.3   ALB 3.0*   AP 99        Thyroid Studies Lab Results   Component Value Date/Time    TSH 0.67 09/24/2022 05:25 AM        Procedures/imaging: see electronic medical records for all procedures/Xrays and details which were not copied into this note but were reviewed prior to creation of Capo Reyes MD

## 2022-09-26 NOTE — PROGRESS NOTES
Pt is  and independent. Pt does not have or use DME. Anticipate pt will transition home with physician follow up when medically stable. CM to continue to follow and assist as needed. Care Management Interventions  Mode of Transport at Discharge:  Other (see comment) (Family)  Transition of Care Consult (CM Consult): Discharge Planning  Health Maintenance Reviewed: Yes  Support Systems: Spouse/Significant Other  Confirm Follow Up Transport: Self  The Plan for Transition of Care is Related to the Following Treatment Goals : Home with physician follow up  Discharge Location  Patient Expects to be Discharged to[de-identified] Home with family assistance

## 2022-09-26 NOTE — PROGRESS NOTES
Problem: Risk for Spread of Infection  Goal: Prevent transmission of infectious organism to others  Description: Prevent the transmission of infectious organisms to other patients, staff members, and visitors. Outcome: Progressing Towards Goal     Problem: Patient Education:  Go to Education Activity  Goal: Patient/Family Education  Outcome: Progressing Towards Goal     Problem: Falls - Risk of  Goal: *Absence of Falls  Description: Document Carrol Gu Fall Risk and appropriate interventions in the flowsheet. Outcome: Progressing Towards Goal  Note: Fall Risk Interventions:            Medication Interventions: Teach patient to arise slowly, Patient to call before getting OOB                   Problem: Patient Education: Go to Patient Education Activity  Goal: Patient/Family Education  Outcome: Progressing Towards Goal     Problem:  Activity Intolerance  Goal: *Oxygen saturation during activity within specified parameters  Outcome: Progressing Towards Goal  Goal: *Able to remain out of bed as prescribed  Outcome: Progressing Towards Goal     Problem: Patient Education: Go to Patient Education Activity  Goal: Patient/Family Education  Outcome: Progressing Towards Goal

## 2022-09-27 LAB
ALBUMIN SERPL-MCNC: 2.9 G/DL (ref 3.4–5)
ALBUMIN/GLOB SERPL: 0.6 {RATIO} (ref 0.8–1.7)
ALP SERPL-CCNC: 89 U/L (ref 45–117)
ALT SERPL-CCNC: 24 U/L (ref 16–61)
ANION GAP SERPL CALC-SCNC: 5 MMOL/L (ref 3–18)
AST SERPL-CCNC: 23 U/L (ref 10–38)
B DERMAT AB TITR SER: NEGATIVE {TITER}
BASOPHILS # BLD: 0 K/UL (ref 0–0.1)
BASOPHILS NFR BLD: 1 % (ref 0–2)
BILIRUB SERPL-MCNC: 0.6 MG/DL (ref 0.2–1)
BUN SERPL-MCNC: 12 MG/DL (ref 7–18)
BUN/CREAT SERPL: 15 (ref 12–20)
CALCIUM SERPL-MCNC: 9.2 MG/DL (ref 8.5–10.1)
CHLORIDE SERPL-SCNC: 101 MMOL/L (ref 100–111)
CO2 SERPL-SCNC: 28 MMOL/L (ref 21–32)
CREAT SERPL-MCNC: 0.82 MG/DL (ref 0.6–1.3)
DIFFERENTIAL METHOD BLD: ABNORMAL
EOSINOPHIL # BLD: 0.7 K/UL (ref 0–0.4)
EOSINOPHIL NFR BLD: 12 % (ref 0–5)
ERYTHROCYTE [DISTWIDTH] IN BLOOD BY AUTOMATED COUNT: 14.2 % (ref 11.6–14.5)
GLOBULIN SER CALC-MCNC: 4.5 G/DL (ref 2–4)
GLUCOSE SERPL-MCNC: 96 MG/DL (ref 74–99)
HCT VFR BLD AUTO: 38.6 % (ref 36–48)
HGB BLD-MCNC: 12.1 G/DL (ref 13–16)
IMM GRANULOCYTES # BLD AUTO: 0.1 K/UL (ref 0–0.04)
IMM GRANULOCYTES NFR BLD AUTO: 1 % (ref 0–0.5)
INR PPP: 2.2 (ref 0.8–1.2)
LYMPHOCYTES # BLD: 1.1 K/UL (ref 0.9–3.6)
LYMPHOCYTES NFR BLD: 20 % (ref 21–52)
MCH RBC QN AUTO: 27.1 PG (ref 24–34)
MCHC RBC AUTO-ENTMCNC: 31.3 G/DL (ref 31–37)
MCV RBC AUTO: 86.4 FL (ref 78–100)
MONOCYTES # BLD: 0.6 K/UL (ref 0.05–1.2)
MONOCYTES NFR BLD: 10 % (ref 3–10)
NEUTS SEG # BLD: 3.3 K/UL (ref 1.8–8)
NEUTS SEG NFR BLD: 57 % (ref 40–73)
NRBC # BLD: 0 K/UL (ref 0–0.01)
NRBC BLD-RTO: 0 PER 100 WBC
PLATELET # BLD AUTO: 158 K/UL (ref 135–420)
PMV BLD AUTO: 9.4 FL (ref 9.2–11.8)
POTASSIUM SERPL-SCNC: 4 MMOL/L (ref 3.5–5.5)
PROT SERPL-MCNC: 7.4 G/DL (ref 6.4–8.2)
PROTHROMBIN TIME: 25.2 SEC (ref 11.5–15.2)
RBC # BLD AUTO: 4.47 M/UL (ref 4.35–5.65)
SODIUM SERPL-SCNC: 134 MMOL/L (ref 136–145)
WBC # BLD AUTO: 5.7 K/UL (ref 4.6–13.2)

## 2022-09-27 PROCEDURE — 87556 M.TUBERCULO DNA AMP PROBE: CPT

## 2022-09-27 PROCEDURE — 74011250637 HC RX REV CODE- 250/637: Performed by: INTERNAL MEDICINE

## 2022-09-27 PROCEDURE — 87102 FUNGUS ISOLATION CULTURE: CPT

## 2022-09-27 PROCEDURE — 36415 COLL VENOUS BLD VENIPUNCTURE: CPT

## 2022-09-27 PROCEDURE — 87798 DETECT AGENT NOS DNA AMP: CPT

## 2022-09-27 PROCEDURE — 85610 PROTHROMBIN TIME: CPT

## 2022-09-27 PROCEDURE — 87116 MYCOBACTERIA CULTURE: CPT

## 2022-09-27 PROCEDURE — 80053 COMPREHEN METABOLIC PANEL: CPT

## 2022-09-27 PROCEDURE — 65270000029 HC RM PRIVATE

## 2022-09-27 PROCEDURE — 85025 COMPLETE CBC W/AUTO DIFF WBC: CPT

## 2022-09-27 PROCEDURE — 87385 HISTOPLASMA CAPSUL AG IA: CPT

## 2022-09-27 RX ADMIN — ALBUTEROL SULFATE 2 PUFF: 108 INHALANT RESPIRATORY (INHALATION) at 12:11

## 2022-09-27 RX ADMIN — ALBUTEROL SULFATE 2 PUFF: 108 INHALANT RESPIRATORY (INHALATION) at 21:30

## 2022-09-27 RX ADMIN — ALBUTEROL SULFATE 2 PUFF: 108 INHALANT RESPIRATORY (INHALATION) at 00:13

## 2022-09-27 RX ADMIN — ALBUTEROL SULFATE 2 PUFF: 108 INHALANT RESPIRATORY (INHALATION) at 04:29

## 2022-09-27 RX ADMIN — ALBUTEROL SULFATE 2 PUFF: 108 INHALANT RESPIRATORY (INHALATION) at 09:49

## 2022-09-27 RX ADMIN — ALBUTEROL SULFATE 2 PUFF: 108 INHALANT RESPIRATORY (INHALATION) at 15:34

## 2022-09-27 NOTE — PROGRESS NOTES
0959   Pt is awake, alert, oriented x4. Resting in bed. Lungs are clear. Abdomen soft with active BS. Pt with productive cough of bloody sputum. 2 specimens at bedside on Ice to be picked up by [facility]. 1100   Pulmonologist  called RN that  pt's bronchoscopy was cancelled because of  + Covid test on Sept. 1, 2022. When verified with pt, pt does not remember having a Covid test in September. Pulmonologist will come and talk to pt later but pt was told he can eat. 1235   Urine for Histoplasmosis was obtained and sent to lab.  1400  Sputum Specimen were collected by someone from [facility]. 1450  Pt was seen by Dr Nani Tirado. Pt was placed NPO after MN for  Bronchoscopy tomorrow.

## 2022-09-27 NOTE — PROGRESS NOTES
Pulmonary and Sleep Medicine     Pulmonary Note    Patient: Crisat Barba               Sex: male          DOA: 9/22/2022       YOB: 1955      Age:  75 y.o.        LOS:  LOS: 5 days        Reason for Consult: lung nodules    IMPRESSION:   Multiple pulmonary nodules - R91.8  OSKAR - G47.33  Hx of PE   RECOMMENDATIONS:   Compensated respiratory status; monitor for goal SPO2> 91%  Sputum culture and AFB smear and culture  AFB smearx3 negative safe to do bronch tomorrow  Agree with ID for HIV test, Q TB test, parasite blood exam, echo  I will check MYRNA, ANCA, ACE, RF, CCP  Any indication for bronchoscopy based on clinical course and inability to collect respiratory samples for culture and AFB smear  He is on Coumadin and reversal may be required  May use bronchodilators for wheezing, pulmonary hygiene care  Monitor off of antibiotic  Aspiration prevention bundle, head of the bed at 30' all times  May use CPAP at night to sleep with once AFB negative  Glycemic control  Stress ulcer and DVT prophylaxis  Discussed care plan with patient, Sydney Torres, Macho  Will defer respective systems problem management to primary and other consultant and follow patient with primary and other team  Further recommendations will be based on the patient's response to recommended treatment and results of the investigation ordered. [x]Total care time exclusive of procedures 65 minutes with complex decision making performed and > 50% time spent in face to face consultation. HPI:   Mr. Crista Barba is a 75 y.o. male who is known to my associate Dr. Юлия Valdivia as he came this year in our office for evaluation for exertional dyspnea symptoms. The patient has prior history of pulmonary embolism. He had massive pulmonary embolism in July 2007 following travel history, and was placed on chronic anticoagulation therapy. Patient denies any prior history of asthma or COPD.   He reports prior sleep study being negative for sleep apnea in 2007. Patient has prior COVID infections in the last 2 years-December 2020, and December 2021. Patient also took [address] vaccinations- March 2021, April 2021, and subsequently booster dose in April 2022. Following COVID vaccination booster dose in April 2022, patient developed flu-like symptoms with fever, shortness of breath, fatigue. Subsequently, he has traveled to [facility]. Patient saw his PCP April 2022. Reports of dyspnea on exertion, cough and phlegm, weight loss of 50 lbs since April 2022 and was found to be hypoxemic during walk test with PCP  He was placed on home oxygen therapy. He had CT chest done May 2022 [facility] which was negative for PEs-while noted with multiple lung nodules. Patient on chronic coumadin therapy for history of PEs. Eventually on repeat 6 min walk test during 07/2022 in our office he came off of O2. A follow up CT chest 09/20/22 at [facility] showed worsening in lung nodules leading to recommendation of hospitalization for work up of Tb from Dr. Juan Alberto Bullock. He reports long back PPD that was negative. Wife is asymptomatic. In past several months, he denies any night sweat, fevers, chills, adenopathy, chest pain, hemoptysis, lightheadedness, palpitations, syncope, orthopnea, paroxysmal nocturnal dyspnea.      9/26/2022  WBC normal  AFB x3 smear negative  Keep precautions and bronch in AM tomoorw  We cancelled  bronch today as I was told covid positive 9/1/2022  Later Dr Vianey Rivas called [facility] care department and confirmed that it was positive in 2021 but 9/1/2022 they put information in the system  I called anesthesia and got new  date  on 9/28/2022 at 11.30 am  Npo after midnight  Hold coumadin    Review of Systems  General ROS: negative for  - fever, chills, malaise  Psychological ROS: negative for - anxiety or depression  Ophthalmic ROS: negative for - eye pain, loss of vision or photophobia  ENT ROS: negative for - epistaxis, headaches, sinus pain or vocal changes  Allergy and Immunology ROS: negative for - hives or postnasal drip  Hematological and Lymphatic ROS: negative for - bleeding problems, blood clots, jaundice, pallor or swollen lymph nodes  Endocrine ROS: negative for - skin changes, temperature intolerance  Cardiovascular ROS: negative for - chest pain, murmur, orthopnea, palpitations or pnd  Gastrointestinal ROS: no nausea, vomiting, abdominal pain, change in bowel habits, or black or bloody stools  Genito-Urinary ROS: no dysuria, trouble voiding, or hematuria  Musculoskeletal ROS: negative for - muscle pain or muscular weakness  Neurological ROS: no TIA or stroke symptoms  Dermatological ROS: negative for - pruritus, rash or skin lesion changes     Past Medical History  Past Medical History:   Diagnosis Date    COVID     Hypoxemia     Pulmonary embolism (HCC)        Past Surgical History  History reviewed. No pertinent surgical history. Family History  History reviewed. No pertinent family history. Social History  Social History     Tobacco Use    Smoking status: Never    Smokeless tobacco: Never        Medications  Current Facility-Administered Medications   Medication Dose Route Frequency    albuterol (PROVENTIL HFA, VENTOLIN HFA, PROAIR HFA) inhaler 2 Puff  2 Puff Inhalation Q4H PRN    albuterol (PROVENTIL HFA, VENTOLIN HFA, PROAIR HFA) inhaler 2 Puff  2 Puff Inhalation Q4H     Prior to Admission medications    Medication Sig Start Date End Date Taking? Authorizing Provider   warfarin (COUMADIN) 4 mg tablet Take 4 mg by mouth every other day. Yes Provider, Historical   warfarin (COUMADIN) 5 mg tablet Take 5 mg by mouth every other day. Yes Provider, Historical       Allergy  No Known Allergies    Physical Exam:   Vital Signs:    Blood pressure 118/70, pulse 88, temperature 98.5 °F (36.9 °C), resp. rate 18, height 5' 7\" (1.702 m), weight 113.4 kg (250 lb), SpO2 96 %. Body mass index is 39.16 kg/m².     O2 Device: None (Room air) Temp (24hrs), Av.1 °F (36.7 °C), Min:97.7 °F (36.5 °C), Max:98.5 °F (36.9 °C)       Intake/Output:   Last shift:      No intake/output data recorded. Last 3 shifts: No intake/output data recorded.     Intake/Output Summary (Last 24 hours) at 2022 1558  Last data filed at 2022 0620  Gross per 24 hour   Intake 0 ml   Output 0 ml   Net 0 ml        General: in no respiratory distress and acyanotic, alert, and oriented times 3, no distress, moderately obese, on room air  HEENT: PERRLA, EOMI, fundi benign, throat normal without erythema or exudate  Neck: No abnormally enlarged lymph nodes or thyroid, supple  Chest: obese chest wall  Lungs: moderate air entry; few rhonchi bl scattered, no wheezes bilaterally, normal percussion bilaterally, no tenderness/ rash; exam limitation secondary to body habitus  Heart: Regular rate and rhythm, S1S2 present, or without murmur or extra heart sounds  Abdomen: obese, bowel sounds normoactive, tympanic, abdomen is soft without significant tenderness, masses, organomegaly or guarding  Extremity: bl pedal edema, no cyanosis, peripheral pulses 2+ and symmetric  Neuro: alert, oriented x 3, no defects noted in general exam.  Skin: Skin color, texture, turgor normal. No rashes or lesions    Labs Reviewed:  Recent Results (from the past 24 hour(s))   PROTHROMBIN TIME + INR    Collection Time: 22  4:09 AM   Result Value Ref Range    Prothrombin time 25.2 (H) 11.5 - 15.2 sec    INR 2.2 (H) 0.8 - 1.2     CBC WITH AUTOMATED DIFF    Collection Time: 22  4:09 AM   Result Value Ref Range    WBC 5.7 4.6 - 13.2 K/uL    RBC 4.47 4.35 - 5.65 M/uL    HGB 12.1 (L) 13.0 - 16.0 g/dL    HCT 38.6 36.0 - 48.0 %    MCV 86.4 78.0 - 100.0 FL    MCH 27.1 24.0 - 34.0 PG    MCHC 31.3 31.0 - 37.0 g/dL    RDW 14.2 11.6 - 14.5 %    PLATELET 903 316 - 106 K/uL    MPV 9.4 9.2 - 11.8 FL    NRBC 0.0 0  WBC    ABSOLUTE NRBC 0.00 0.00 - 0.01 K/uL    NEUTROPHILS 57 40 - 73 %    LYMPHOCYTES 20 (L) 21 - 52 %    MONOCYTES 10 3 - 10 %    EOSINOPHILS 12 (H) 0 - 5 %    BASOPHILS 1 0 - 2 %    IMMATURE GRANULOCYTES 1 (H) 0.0 - 0.5 %    ABS. NEUTROPHILS 3.3 1.8 - 8.0 K/UL    ABS. LYMPHOCYTES 1.1 0.9 - 3.6 K/UL    ABS. MONOCYTES 0.6 0.05 - 1.2 K/UL    ABS. EOSINOPHILS 0.7 (H) 0.0 - 0.4 K/UL    ABS. BASOPHILS 0.0 0.0 - 0.1 K/UL    ABS. IMM. GRANS. 0.1 (H) 0.00 - 0.04 K/UL    DF AUTOMATED     METABOLIC PANEL, COMPREHENSIVE    Collection Time: 09/27/22  4:09 AM   Result Value Ref Range    Sodium 134 (L) 136 - 145 mmol/L    Potassium 4.0 3.5 - 5.5 mmol/L    Chloride 101 100 - 111 mmol/L    CO2 28 21 - 32 mmol/L    Anion gap 5 3.0 - 18 mmol/L    Glucose 96 74 - 99 mg/dL    BUN 12 7.0 - 18 MG/DL    Creatinine 0.82 0.6 - 1.3 MG/DL    BUN/Creatinine ratio 15 12 - 20      GFR est AA >60 >60 ml/min/1.73m2    GFR est non-AA >60 >60 ml/min/1.73m2    Calcium 9.2 8.5 - 10.1 MG/DL    Bilirubin, total 0.6 0.2 - 1.0 MG/DL    ALT (SGPT) 24 16 - 61 U/L    AST (SGOT) 23 10 - 38 U/L    Alk. phosphatase 89 45 - 117 U/L    Protein, total 7.4 6.4 - 8.2 g/dL    Albumin 2.9 (L) 3.4 - 5.0 g/dL    Globulin 4.5 (H) 2.0 - 4.0 g/dL    A-G Ratio 0.6 (L) 0.8 - 1.7             No results for input(s): FIO2I, IFO2, HCO3I, IHCO3, HCOPOC, PCO2I, PCOPOC, IPHI, PHI, PHPOC, PO2I, PO2POC in the last 72 hours. No lab exists for component: IPOC2      All Micro Results       Procedure Component Value Units Date/Time    AFB CULTURE + SMEAR W/RFLX ID FROM CULTURE [[id number]]     Order Status: Kiah Main [[id number]]     Order Status: Sent Specimen: Bronchial lavage     CULTURE, BODY FLUID Isabel Domingo [[id number]]     Order Status: Sent Specimen: Body Fluid from Miscellaneous Fluid     CULTURE, ANAEROBIC [[id number]]     Order Status: Sent Specimen:  Body fld     CULTURE, BLOOD [[id number]] Collected: 09/22/22 1445    Order Status: Completed Specimen: Blood Updated: 09/27/22 0711     Special Requests: NO SPECIAL REQUESTS        Culture result: NO GROWTH 5 DAYS       CULTURE, BLOOD 2nd DRAW (required for [facility]) [[id number]] Collected: 09/22/22 1445    Order Status: Completed Specimen: Blood Updated: 09/27/22 0711     Special Requests: NO SPECIAL REQUESTS        Culture result: NO GROWTH 5 DAYS       AFB CULTURE + SMEAR W/RFLX ID FROM CULTURE [[id number]] Collected: 09/23/22 0615    Order Status: Completed Specimen: Miscellaneous sample Updated: 09/26/22 1537     Source SPUTUM        AFB Specimen processing Concentration     Acid Fast Smear Negative        Comment: (NOTE)  Performed At: [facility]  [address] [place] [id number]  Brandi Lopez MD DM:[id number]          Acid Fast Culture PENDING    AFB CULTURE + SMEAR W/RFLX ID FROM CULTURE [[id number]] Collected: 09/24/22 6116    Order Status: Completed Specimen: Miscellaneous sample Updated: 09/25/22 1636     Source SPUTUM        AFB Specimen processing Concentration     Acid Fast Smear Negative        Comment: (NOTE)  Performed At: [facility]  [address] [place] [id number]  Brandi Lopez MD GS:[id number]          Acid Fast Culture PENDING    AFB CULTURE + SMEAR W/RFLX ID FROM CULTURE [[id number]] Collected: 09/23/22 1650    Order Status: Completed Specimen: Miscellaneous sample Updated: 09/24/22 1636     Source SPUTUM        AFB Specimen processing Concentration     Acid Fast Smear Negative        Comment: (NOTE)  Performed At: [facility]  [address] [place] [id number]  Brandi Lopez MD AV:[id number]          Acid Fast Culture PENDING    ACID FAST SMEAR [[id number]] Collected: 09/23/22 1650    Order Status: Canceled     CRYPTOCOCCUS AG W/REFLEX TITER [[id number]] Collected: 09/23/22 0800    Order Status: Completed Specimen: Blood Updated: 09/23/22 1613     Cryptococcus Ag Negative       RESPIRATORY VIRUS PANEL W/COVID-19, PCR [[id number]] Collected: 09/23/22 1600    Order Status: Canceled Specimen: NASOPHARYNGEAL SWAB     AFB CULTURE + SMEAR W/RFLX ID FROM CULTURE [[id number]] Collected: 09/23/22 0800    Order Status: Canceled     AFB CULTURE + SMEAR W/RFLX ID FROM CULTURE [[id number]]     Order Status: Canceled     AFB CULTURE + SMEAR W/RFLX ID FROM CULTURE [[id number]]     Order Status: Canceled     OVA & PARASITES, STOOL [[id number]]     Order Status: Canceled Specimen: Feces from Stool     ANEESH Kimble [[id number]] Collected: 09/22/22 2000    Order Status: Canceled Specimen: Serum from Blood                 Imaging:  [x]I have personally reviewed the patients chest radiographs images and report with the patient  Results from Hospital Encounter encounter on 09/22/22    XR CHEST PORT    Narrative  EXAM: XR CHEST PORT    CLINICAL INDICATION/HISTORY: baseline CXR prior to treatment  -Additional: None    COMPARISON: CT 9/23/2022    TECHNIQUE: Frontal view of the chest    _______________    FINDINGS:    HEART AND MEDIASTINUM: The cardiac size is normal. There is tortuosity of the  thoracic aorta. LUNGS AND PLEURAL SPACES: Lungs are mildly underexpanded. There is no focal  pneumonic consolidation. No evidence of pneumothorax or pleural effusion. BONY THORAX AND SOFT TISSUES: No acute osseous abnormality. Monitoring device  projects over the left upper chest wall.    _______________    Impression  Mildly underexpanded lungs without superimposed acute radiographic  cardiopulmonary abnormality. Results from Hospital Encounter encounter on 09/22/22    CTA CHEST ABD PELV W CONT    Narrative  EXAM: CTA of the chest, abdomen, and pelvis    CLINICAL INDICATION/HISTORY: Fever, weight loss, hypoxemia    COMPARISON: Outside chest CT report dated 9/20/2022. Images not available for  comparison. TECHNIQUE: Helical CT imaging of the chest initially performed without  intravenous contrast. Helical CT imaging of the chest, abdomen, and pelvis was  then performed with intravenous contrast. Maximum intensity projections were  generated.  One or more dose reduction techniques were used on this CT: automated  exposure control, adjustment of the mAs and/or kVp according to patient size,  and iterative reconstruction techniques. The specific techniques used on this  CT exam have been documented in the patient's electronic medical record. Digital  Imaging and Communications in Medicine (DICOM) format image data are available  to nonaffiliated external healthcare facilities or entities on a secured, media  free, reciprocally searchable basis with patient authorization for at least a  12-month period after this study. _______________    FINDINGS:    VASCULAR: No intramural hematoma throughout the thoracic aorta on noncontrast  imaging. Mild atherosclerotic calcifications. No aortic aneurysm or dissection. No pulmonary embolism. CHEST:    LUNGS: Numerous bilateral pulmonary nodules identified measuring less than 6 mm  in size. Most of these are in a centrilobular pattern. No dense consolidation. AIRWAY: Scattered secretions in the airway. PLEURA: Normal, with no effusion or pneumothorax. MEDIASTINUM: Normal heart size. No pericardial effusion. LYMPH NODES: Mildly enlarged hilar lymph nodes which are likely reactive. No  bulky lymphadenopathy in the chest.    OTHER: None.    ===============    ABDOMEN/PELVIS:    LIVER, BILIARY: Mild lobular contours of the liver. Subcentimeter hypodensity in  the inferior right hepatic lobe is too small to characterize. No biliary  dilation. Cholelithiasis with mild gallbladder wall thickening. PANCREAS: Unremarkable. SPLEEN: Unremarkable. ADRENALS: Unremarkable. KIDNEYS/URETERS/BLADDER: Peripelvic renal cysts which do not require follow-up  imaging. Left renal calculi. No ureteral calculi or hydronephrosis. No  suspicious renal mass. PELVIC ORGANS: Unremarkable. LYMPH NODES: No enlarged lymph nodes. GASTROINTESTINAL TRACT: No bowel dilation or wall thickening.  Colonic  diverticulosis with no evidence of diverticulitis. The appendix is normal.    BONES: No acute or aggressive osseous abnormalities identified. OTHER: Fat-containing umbilical hernia.    _______________    Impression  1. No aortic aneurysm or dissection. No pulmonary embolism. 2. Numerous bilateral pulmonary nodules which are likely infectious or  inflammatory in etiology. Recommend follow-up chest CT in 2-3 months after  treatment to assure resolution. 3. Subtle nodular contours of the liver raising the possibility of chronic liver  disease. 4. Cholelithiasis with diffuse gallbladder wall thickening which could relate to  acute or chronic cholecystitis versus passive congestion.        [x]See my orders for details            Rona Officer, MD

## 2022-09-27 NOTE — PROGRESS NOTES
Problem: Falls - Risk of  Goal: *Absence of Falls  Description: Document Ashwin Lobe Fall Risk and appropriate interventions in the flowsheet.   Outcome: Progressing Towards Goal  Note: Fall Risk Interventions:            Medication Interventions: Teach patient to arise slowly

## 2022-09-27 NOTE — PROGRESS NOTES
Health care department admitted it was placed in chart 9/1/2022  He was positive in 2021  ID doctor confirmed  I am calling anesthesia to clarify  Keep NPO after midnight I hope we can add on tomorrow if anesthesia available  A. Wilburn Severin MD

## 2022-09-27 NOTE — PROGRESS NOTES
Bedside, Verbal, and Written shift change report given to PRUDENCE Arevalo (oncoming nurse) by Felicia Virgen (offgoing nurse). Report included the following information SBAR, Kardex, Intake/Output, and MAR.

## 2022-09-27 NOTE — PROGRESS NOTES
Lewis Mai is scheduled for a bronchoscopy today under anesthesia. However, he tested positive for COVID on 9/1/22. The most recent data suggests that elective procedures under anesthesia should be postponed at least 6 weeks out from a recent covid infection. Unless this is an emergent procedure, it should be rescheduled after the 6 week window.     Margoth oLgan MD, PhD  Anesthesiology

## 2022-09-27 NOTE — PROGRESS NOTES
As per Anesthesia hospital protocol 6 weeks need to pass after positive COVID test.  I was not aware of positive covid test on September 1/2002  It is in [facility] dep report  He will need to be scheduled outpatient  I will let Dr. Donna Hensley know as he is going to be Dc home  ALEM Cedillo MD

## 2022-09-27 NOTE — PROGRESS NOTES
Problem: Risk for Spread of Infection  Goal: Prevent transmission of infectious organism to others  Description: Prevent the transmission of infectious organisms to other patients, staff members, and visitors. Outcome: Not Met     Problem: Falls - Risk of  Goal: *Absence of Falls  Description: Document Lynda Noe Fall Risk and appropriate interventions in the flowsheet.   Outcome: Progressing Towards Goal  Note: Fall Risk Interventions:            Medication Interventions: Teach patient to arise slowly, Patient to call before getting OOB

## 2022-09-27 NOTE — ROUTINE PROCESS
1926  Bedside and Verbal shift change report given to Lashay Nicole RN (oncoming nurse) by Benjamin Fall RN (offgoing nurse). Report included the following information SBAR, Kardex, and MAR.

## 2022-09-27 NOTE — PROGRESS NOTES
[facility]  (A Division of [address])    Krystyna Remy MD  Office #: - Option # [phone]  Fax #: [phone]     Date of Admission: 9/22/2022      Date of Note: 9/27/2022   Reason for Referral: Evaluation and antibiotic management of BL pul nodules, suspected PTB      Current Antimicrobials:    Prior Antimicrobials:      Antibiotic allergy:      Assessment:     Suspected pul TB.   --Numerous bilateral pulmonary nodules which are likely infectious or inflammatory in etiology on CT chest done 9/20/22( no mediastinal/ hilar/axillar LAP noted). Lesions have progressed since last CT 5/20/22 . High on  DDX would be Pul TB/ Miliary TB- other fungal chronic infections Vs non  infectiout etiology such as Connetive tissue ds or maliagnancy possible. 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 Quantiferon TB pending  --HIV 1/2/0- Non reactive, Crypto ag neg, Hep B Ag-neg, hep C ab-- negative  --HB A1C= 5.2%  Weight loss and Fatigue  CTA chest /ABD/Pelvis-- 9/23--pul nodules, chronic liver disease, cholelithiasis   Elevated ER/CRP- 61/5.2 respectively  Morbidly Obese  History of COVID 19 Infection X2. 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 There is reportedly + COVID 19 test attributed to 9/1/22--Media section. -- have [facility] patient and department of health -- there was no such COVID 19 test done on the mentioned date of Sept 1 -- as per patient history or Health Department.   -- if no procedure planned on this admission, can DC home from ID side [facility] pt/health department/ Dr George Brown and Dr Yousif Jimenez   Asked Nurse supervisor to investigate on the mis-leading information scanned to the media    Subjective:     Patient has no complaints. Upset-- explained and showed to him the scanned in record in [facility]  Not aware of any testing in Sept-- he does all his test at [place]-- neither he nor family members had + COVID test in Sept  Last covid test on record-- may 2, 2022    Notes/Labs/Imaging reports reviewed    Shortness of breath [R06.02]  History reviewed. No pertinent surgical history. History reviewed. No pertinent family history.   Medications reviewed as below:   Current Facility-Administered Medications   Medication Dose Route Frequency Provider Last Rate Last Admin    albuterol (PROVENTIL HFA, VENTOLIN HFA, PROAIR HFA) inhaler 2 Puff  2 Puff Inhalation Q4H PRN Yancy Torres MD        albuterol (PROVENTIL HFA, VENTOLIN HFA, PROAIR HFA) inhaler 2 Puff  2 Puff Inhalation Q4H Yancy Torres MD   2 Puff at 09/27/22 0949     No Known Allergies  Social History     Socioeconomic History    Marital status:      Spouse name: Not on file    Number of children: Not on file    Years of education: Not on file    Highest education level: Not on file   Occupational History    Not on file   Tobacco Use    Smoking status: Never    Smokeless tobacco: Never   Substance and Sexual Activity    Alcohol use: Not on file    Drug use: Not on file    Sexual activity: Not on file   Other Topics Concern    Not on file   Social History Narrative    Not on file     Social Determinants of Health     Financial Resource Strain: Not on file   Food Insecurity: Not on file   Transportation Needs: Not on file   Physical Activity: Not on file   Stress: Not on file   Social Connections: Not on file   Intimate Partner Violence: Not on file   Housing Stability: Not on file        Review of Systems    Negative Unless BOLDED    General: fevers, chills, myalgias, arthralgias, weight loss, malaise, fatigue. HEENT:  headaches,sinus pain or presure, recent URI, recent dental procedures;  tinnitus, hearing loss , visual changes, catarats, dizziness or blurred vision  PUlMONARY:  cough , shortness of breath, scant sputum production, hx of asthma or COPD. previous treatement for TB or PPD. Objective:      Visit Vitals  /76 (BP 1 Location: Right upper arm, BP Patient Position: At rest;Lying)   Pulse 75   Temp 98.1 °F (36.7 °C)   Resp 17   Ht 5' 7\" (1.702 m)   Wt 113.4 kg (250 lb)   SpO2 94%   BMI 39.16 kg/m²     Temp (24hrs), Av.2 °F (36.8 °C), Min:97.7 °F (36.5 °C), Max:99 °F (37.2 °C)      Lines / Catheters:   PIV    General:   awake alert and oriented- upset   Skin:   no rashes or skin lesions noted on limited exam  Pigmented skin- left leg from DVT   HEENT:  Normocephalic, atraumatic,no scleral icterus . Dentition good. Neck supple, no bruits. Lungs:   non-labored   Heart:  RRR   Abdomen:  soft,, obese. Non-tender   Genitourinary:  deferred   Extremities:   no clubbing, cyanosis; Neurologic:  No gross focal sensory abnormalities; 5/5 muscle strength to upper and lower extremities. Speech appropirate. Cranial nerves intact   Psychiatric:   appropriate and interactive. Results       Procedure Component Value Units Date/Time    AFB CULTURE + SMEAR W/RFLX ID FROM CULTURE [[id number]]     Order Status: Leatha Herr [[id number]]     Order Status: Sent Specimen: Bronchial lavage     CULTURE, BODY FLUID [facility] [[id number]]     Order Status: Sent Specimen: Body Fluid from Miscellaneous Fluid     CULTURE, ANAEROBIC [[id number]]     Order Status: Sent Specimen:  Body fld     AFB CULTURE + SMEAR W/RFLX ID FROM CULTURE [[id number]] Collected: 22 0903    Order Status: Completed Specimen: Miscellaneous sample Updated: 22 1636     Source SPUTUM        AFB Specimen processing Concentration     Acid Fast Smear Negative        Comment: (NOTE)  Performed At: [facility]  [address] [facility] [id number]  Stefany Pinto MD PS:[id number]          Acid Fast Culture PENDING    ACID FAST SMEAR [[id number]] Collected: 09/23/22 1650    Order Status: Canceled     AFB CULTURE + SMEAR W/RFLX ID FROM CULTURE [[id number]] Collected: 09/23/22 1650    Order Status: Completed Specimen: Miscellaneous sample Updated: 09/24/22 1636     Source SPUTUM        AFB Specimen processing Concentration     Acid Fast Smear Negative        Comment: (NOTE)  Performed At: [facility]  [address] [facility] [id number]  Stefany Pinto MD TP:[id number]          Acid Fast Culture PENDING    RESPIRATORY VIRUS PANEL W/COVID-19, PCR [[id number]] Collected: 09/23/22 1600    Order Status: Canceled Specimen: NASOPHARYNGEAL SWAB     CRYPTOCOCCUS AG Mg-Nguyễn TITER [[id number]] Collected: 09/23/22 0800    Order Status: Completed Specimen: Blood Updated: 09/23/22 1613     Cryptococcus Ag Negative       AFB CULTURE + SMEAR W/RFLX ID FROM CULTURE [[id number]] Collected: 09/23/22 0800    Order Status: Canceled     AFB CULTURE + SMEAR W/RFLX ID FROM CULTURE [[id number]] Collected: 09/23/22 0615    Order Status: Completed Specimen: Miscellaneous sample Updated: 09/26/22 1537     Source SPUTUM        AFB Specimen processing Concentration     Acid Fast Smear Negative        Comment: (NOTE)  Performed At: [facility]  [address] [facility] [id number]  Stefany Pinto MD JI:[id number]          Acid Fast Culture PENDING    AFB CULTURE + SMEAR W/RFLX ID FROM CULTURE [[id number]]     Order Status: Canceled     AFB CULTURE + SMEAR W/RFLX ID FROM CULTURE [[id number]]     Order Status: Canceled     OVA & PARASITES, STOOL [[id number]]     Order Status: Canceled Specimen: [facility] [[id number]] Collected: 09/22/22 2000    Order Status: Canceled Specimen: Serum from Blood     CULTURE, BLOOD [[id number]] Collected: 09/22/22 1445    Order Status: Completed Specimen: Blood Updated: 09/27/22 0711     Special Requests: NO SPECIAL REQUESTS        Culture result: NO GROWTH 5 DAYS       CULTURE, BLOOD 2nd DRAW (required for [facility]) [[id number]] Collected: 09/22/22 1445    Order Status: Completed Specimen: Blood Updated: 09/27/22 0711     Special Requests: NO SPECIAL REQUESTS        Culture result: NO GROWTH 5 DAYS               Labs: Results:   Chemistry Recent Labs     09/27/22 0409 09/26/22 0441 09/25/22  0424   GLU 96 92 94   * 136 136   K 4.0 4.4 4.9    101 103   CO2 28 30 31   BUN 12 13 13   CREA 0.82 0.72 0.77   CA 9.2 9.1 9.1   AGAP 5 5 2*   BUCR 15 18 17   AP 89 99 93   TP 7.4 7.3 7.0   ALB 2.9* 3.0* 3.0*   GLOB 4.5* 4.3* 4.0   AGRAT 0.6* 0.7* 0.8      CBC w/Diff Recent Labs     09/27/22 0409 09/26/22 0441 09/25/22  0424   WBC 5.7 5.3 5.4   RBC 4.47 4.48 4.35   HGB 12.1* 12.2* 11.9*   HCT 38.6 38.9 37.8    181 172   GRANS 57 58 58   LYMPH 20* 20* 20*   EOS 12* 12* 12*            Imaging:      All imaging reviewed from Admission to present as per radiology interpretation in [address] [place] [address]

## 2022-09-27 NOTE — PROGRESS NOTES
Per [address]-- discussed with Bela Carey.( [phone]), there is no COVID 19 test done on this patient on Sept 1,2022. His last recorded test was on May 2, 2022-- and it was negative. Around Sept 1, 2022-- patient tells me he was vacationing in [place], and neither he nor his son with same name( but Ivana Buerger) were tested for COVID 19. He had history of COVID 19 infection in the past-- Dec 2020 and 2nd time in 2021. He is vaccinated and boosted. Nina Wilks MD  [facility])  Office #:     -[phone]   Office Fax: [phone]

## 2022-09-27 NOTE — PROGRESS NOTES
NPO after midnight in case we get a spot for tomlandonw  The department of health made an error the test was placed in the system 9/1/2022  Patient had covid in 2021  Dr. Khloe Rosado infectious disease spoke to the and confirmed    ALEM Woodall MD

## 2022-09-28 ENCOUNTER — ANESTHESIA (OUTPATIENT)
Dept: ENDOSCOPY | Age: 67
DRG: 869 | End: 2022-09-28
Payer: MEDICARE

## 2022-09-28 ENCOUNTER — APPOINTMENT (OUTPATIENT)
Dept: GENERAL RADIOLOGY | Age: 67
DRG: 869 | End: 2022-09-28
Attending: INTERNAL MEDICINE
Payer: MEDICARE

## 2022-09-28 ENCOUNTER — ANESTHESIA EVENT (OUTPATIENT)
Dept: ENDOSCOPY | Age: 67
DRG: 869 | End: 2022-09-28
Payer: MEDICARE

## 2022-09-28 VITALS
BODY MASS INDEX: 39.24 KG/M2 | RESPIRATION RATE: 18 BRPM | TEMPERATURE: 97.7 F | WEIGHT: 250 LBS | DIASTOLIC BLOOD PRESSURE: 71 MMHG | HEIGHT: 67 IN | SYSTOLIC BLOOD PRESSURE: 114 MMHG | HEART RATE: 89 BPM | OXYGEN SATURATION: 100 %

## 2022-09-28 PROBLEM — R93.89 ABNORMAL CT OF THE CHEST: Status: ACTIVE | Noted: 2022-09-28

## 2022-09-28 LAB
ALBUMIN SERPL-MCNC: 3.1 G/DL (ref 3.4–5)
ALBUMIN/GLOB SERPL: 0.7 {RATIO} (ref 0.8–1.7)
ALP SERPL-CCNC: 98 U/L (ref 45–117)
ALT SERPL-CCNC: 27 U/L (ref 16–61)
ANION GAP SERPL CALC-SCNC: 4 MMOL/L (ref 3–18)
AST SERPL-CCNC: 25 U/L (ref 10–38)
BACTERIA SPEC CULT: NORMAL
BACTERIA SPEC CULT: NORMAL
BASOPHILS # BLD: 0 K/UL (ref 0–0.1)
BASOPHILS NFR BLD: 1 % (ref 0–2)
BILIRUB SERPL-MCNC: 0.7 MG/DL (ref 0.2–1)
BRONCH. LAVAGE DIFF.,BR: NORMAL
BUN SERPL-MCNC: 11 MG/DL (ref 7–18)
BUN/CREAT SERPL: 14 (ref 12–20)
CALCIUM SERPL-MCNC: 9.3 MG/DL (ref 8.5–10.1)
CHLORIDE SERPL-SCNC: 100 MMOL/L (ref 100–111)
CO2 SERPL-SCNC: 31 MMOL/L (ref 21–32)
CREAT SERPL-MCNC: 0.77 MG/DL (ref 0.6–1.3)
DIFFERENTIAL METHOD BLD: ABNORMAL
EOSINOPHIL # BLD: 0.6 K/UL (ref 0–0.4)
EOSINOPHIL NFR BLD: 12 % (ref 0–5)
EOSINOPHIL NFR BRONCH MANUAL: 74 %
ERYTHROCYTE [DISTWIDTH] IN BLOOD BY AUTOMATED COUNT: 14.2 % (ref 11.6–14.5)
GLOBULIN SER CALC-MCNC: 4.2 G/DL (ref 2–4)
GLUCOSE SERPL-MCNC: 95 MG/DL (ref 74–99)
HCT VFR BLD AUTO: 38.3 % (ref 36–48)
HGB BLD-MCNC: 12.8 G/DL (ref 13–16)
IMM GRANULOCYTES # BLD AUTO: 0 K/UL (ref 0–0.04)
IMM GRANULOCYTES NFR BLD AUTO: 1 % (ref 0–0.5)
INR PPP: 1.7 (ref 0.8–1.2)
LYMPHOCYTES # BLD: 1 K/UL (ref 0.9–3.6)
LYMPHOCYTES NFR BLD: 20 % (ref 21–52)
LYMPHOCYTES NFR BRONCH MANUAL: 12 %
M TB IFN-G BLD-IMP: NEGATIVE
MACROPHAGES NFR BRONCH MANUAL: 7 %
MCH RBC QN AUTO: 29 PG (ref 24–34)
MCHC RBC AUTO-ENTMCNC: 33.4 G/DL (ref 31–37)
MCV RBC AUTO: 86.7 FL (ref 78–100)
MONOCYTES # BLD: 0.5 K/UL (ref 0.05–1.2)
MONOCYTES NFR BLD: 11 % (ref 3–10)
NEUTROPHILS NFR BRONCH MANUAL: 7 %
NEUTS SEG # BLD: 2.9 K/UL (ref 1.8–8)
NEUTS SEG NFR BLD: 56 % (ref 40–73)
NRBC # BLD: 0 K/UL (ref 0–0.01)
NRBC BLD-RTO: 0 PER 100 WBC
PARASITE BLD: NORMAL
PLATELET # BLD AUTO: 180 K/UL (ref 135–420)
PMV BLD AUTO: 9.7 FL (ref 9.2–11.8)
POTASSIUM SERPL-SCNC: 4.7 MMOL/L (ref 3.5–5.5)
PROT SERPL-MCNC: 7.3 G/DL (ref 6.4–8.2)
PROTHROMBIN TIME: 20.6 SEC (ref 11.5–15.2)
QUANTIFERON CRITERIA, QFI1T: NORMAL
QUANTIFERON MITOGEN VALUE: >10 IU/ML
QUANTIFERON NIL VALUE: 0.12 IU/ML
QUANTIFERON TB1 AG: 0.12 IU/ML
QUANTIFERON TB2 AG: 0.1 IU/ML
RBC # BLD AUTO: 4.42 M/UL (ref 4.35–5.65)
SERVICE CMNT-IMP: NORMAL
SERVICE CMNT-IMP: NORMAL
SODIUM SERPL-SCNC: 135 MMOL/L (ref 136–145)
WBC # BLD AUTO: 5.1 K/UL (ref 4.6–13.2)

## 2022-09-28 PROCEDURE — 87798 DETECT AGENT NOS DNA AMP: CPT

## 2022-09-28 PROCEDURE — 87070 CULTURE OTHR SPECIMN AEROBIC: CPT

## 2022-09-28 PROCEDURE — 36415 COLL VENOUS BLD VENIPUNCTURE: CPT

## 2022-09-28 PROCEDURE — 2709999900 HC NON-CHARGEABLE SUPPLY: Performed by: INTERNAL MEDICINE

## 2022-09-28 PROCEDURE — 87186 SC STD MICRODIL/AGAR DIL: CPT

## 2022-09-28 PROCEDURE — 87116 MYCOBACTERIA CULTURE: CPT

## 2022-09-28 PROCEDURE — 87077 CULTURE AEROBIC IDENTIFY: CPT

## 2022-09-28 PROCEDURE — 80053 COMPREHEN METABOLIC PANEL: CPT

## 2022-09-28 PROCEDURE — 74011250636 HC RX REV CODE- 250/636: Performed by: NURSE ANESTHETIST, CERTIFIED REGISTERED

## 2022-09-28 PROCEDURE — 74011250637 HC RX REV CODE- 250/637: Performed by: INTERNAL MEDICINE

## 2022-09-28 PROCEDURE — 87102 FUNGUS ISOLATION CULTURE: CPT

## 2022-09-28 PROCEDURE — 88112 CYTOPATH CELL ENHANCE TECH: CPT

## 2022-09-28 PROCEDURE — 87252 VIRUS INOCULATION TISSUE: CPT

## 2022-09-28 PROCEDURE — 85025 COMPLETE CBC W/AUTO DIFF WBC: CPT

## 2022-09-28 PROCEDURE — 71045 X-RAY EXAM CHEST 1 VIEW: CPT

## 2022-09-28 PROCEDURE — 89051 BODY FLUID CELL COUNT: CPT

## 2022-09-28 PROCEDURE — 76060000034 HC ANESTHESIA 1.5 TO 2 HR: Performed by: INTERNAL MEDICINE

## 2022-09-28 PROCEDURE — 76040000009: Performed by: INTERNAL MEDICINE

## 2022-09-28 PROCEDURE — 87206 SMEAR FLUORESCENT/ACID STAI: CPT

## 2022-09-28 PROCEDURE — 77030008683 HC TU ET CUF COVD -A: Performed by: ANESTHESIOLOGY

## 2022-09-28 PROCEDURE — 0BJ08ZZ INSPECTION OF TRACHEOBRONCHIAL TREE, VIA NATURAL OR ARTIFICIAL OPENING ENDOSCOPIC: ICD-10-PCS | Performed by: INTERNAL MEDICINE

## 2022-09-28 PROCEDURE — 85610 PROTHROMBIN TIME: CPT

## 2022-09-28 RX ORDER — MAGNESIUM SULFATE 100 %
4 CRYSTALS MISCELLANEOUS AS NEEDED
Status: CANCELLED | OUTPATIENT
Start: 2022-09-28

## 2022-09-28 RX ORDER — MIDAZOLAM HYDROCHLORIDE 1 MG/ML
INJECTION, SOLUTION INTRAMUSCULAR; INTRAVENOUS AS NEEDED
Status: DISCONTINUED | OUTPATIENT
Start: 2022-09-28 | End: 2022-09-28 | Stop reason: HOSPADM

## 2022-09-28 RX ORDER — SODIUM CHLORIDE, SODIUM LACTATE, POTASSIUM CHLORIDE, CALCIUM CHLORIDE 600; 310; 30; 20 MG/100ML; MG/100ML; MG/100ML; MG/100ML
INJECTION, SOLUTION INTRAVENOUS
Status: DISCONTINUED | OUTPATIENT
Start: 2022-09-28 | End: 2022-09-28 | Stop reason: HOSPADM

## 2022-09-28 RX ORDER — PROPOFOL 10 MG/ML
INJECTION, EMULSION INTRAVENOUS AS NEEDED
Status: DISCONTINUED | OUTPATIENT
Start: 2022-09-28 | End: 2022-09-28 | Stop reason: HOSPADM

## 2022-09-28 RX ORDER — SODIUM CHLORIDE 0.9 % (FLUSH) 0.9 %
5-40 SYRINGE (ML) INJECTION EVERY 8 HOURS
Status: CANCELLED | OUTPATIENT
Start: 2022-09-28

## 2022-09-28 RX ORDER — INSULIN LISPRO 100 [IU]/ML
INJECTION, SOLUTION INTRAVENOUS; SUBCUTANEOUS ONCE
Status: CANCELLED | OUTPATIENT
Start: 2022-09-28 | End: 2022-09-29

## 2022-09-28 RX ORDER — ONDANSETRON 2 MG/ML
INJECTION INTRAMUSCULAR; INTRAVENOUS AS NEEDED
Status: DISCONTINUED | OUTPATIENT
Start: 2022-09-28 | End: 2022-09-28 | Stop reason: HOSPADM

## 2022-09-28 RX ORDER — SODIUM CHLORIDE 0.9 % (FLUSH) 0.9 %
5-40 SYRINGE (ML) INJECTION AS NEEDED
Status: CANCELLED | OUTPATIENT
Start: 2022-09-28

## 2022-09-28 RX ORDER — SODIUM CHLORIDE, SODIUM LACTATE, POTASSIUM CHLORIDE, CALCIUM CHLORIDE 600; 310; 30; 20 MG/100ML; MG/100ML; MG/100ML; MG/100ML
75 INJECTION, SOLUTION INTRAVENOUS CONTINUOUS
Status: CANCELLED | OUTPATIENT
Start: 2022-09-28

## 2022-09-28 RX ORDER — FENTANYL CITRATE 50 UG/ML
INJECTION, SOLUTION INTRAMUSCULAR; INTRAVENOUS AS NEEDED
Status: DISCONTINUED | OUTPATIENT
Start: 2022-09-28 | End: 2022-09-28 | Stop reason: HOSPADM

## 2022-09-28 RX ORDER — FENTANYL CITRATE 50 UG/ML
25 INJECTION, SOLUTION INTRAMUSCULAR; INTRAVENOUS AS NEEDED
Status: CANCELLED | OUTPATIENT
Start: 2022-09-28

## 2022-09-28 RX ORDER — ALBUTEROL SULFATE 0.83 MG/ML
2.5 SOLUTION RESPIRATORY (INHALATION) AS NEEDED
Status: CANCELLED | OUTPATIENT
Start: 2022-09-28

## 2022-09-28 RX ORDER — SUCCINYLCHOLINE CHLORIDE 100 MG/5ML
SYRINGE (ML) INTRAVENOUS AS NEEDED
Status: DISCONTINUED | OUTPATIENT
Start: 2022-09-28 | End: 2022-09-28 | Stop reason: HOSPADM

## 2022-09-28 RX ADMIN — Medication 100 MG: at 14:32

## 2022-09-28 RX ADMIN — ALBUTEROL SULFATE 2 PUFF: 108 INHALANT RESPIRATORY (INHALATION) at 13:33

## 2022-09-28 RX ADMIN — ONDANSETRON HYDROCHLORIDE 4 MG: 2 INJECTION INTRAMUSCULAR; INTRAVENOUS at 14:53

## 2022-09-28 RX ADMIN — ALBUTEROL SULFATE 2 PUFF: 108 INHALANT RESPIRATORY (INHALATION) at 08:41

## 2022-09-28 RX ADMIN — ALBUTEROL SULFATE 2 PUFF: 108 INHALANT RESPIRATORY (INHALATION) at 00:30

## 2022-09-28 RX ADMIN — ALBUTEROL SULFATE 2 PUFF: 108 INHALANT RESPIRATORY (INHALATION) at 04:30

## 2022-09-28 RX ADMIN — MIDAZOLAM 2 MG: 1 INJECTION INTRAMUSCULAR; INTRAVENOUS at 14:27

## 2022-09-28 RX ADMIN — PROPOFOL 200 MG: 10 INJECTION, EMULSION INTRAVENOUS at 14:32

## 2022-09-28 RX ADMIN — SODIUM CHLORIDE, SODIUM LACTATE, POTASSIUM CHLORIDE, AND CALCIUM CHLORIDE: 600; 310; 30; 20 INJECTION, SOLUTION INTRAVENOUS at 14:27

## 2022-09-28 RX ADMIN — FENTANYL CITRATE 100 MCG: 50 INJECTION, SOLUTION INTRAMUSCULAR; INTRAVENOUS at 14:32

## 2022-09-28 RX ADMIN — ALBUTEROL SULFATE 2 PUFF: 108 INHALANT RESPIRATORY (INHALATION) at 16:45

## 2022-09-28 NOTE — PROGRESS NOTES
[facility]  (A Division of [address] [place] [address])    Malka Parker MD  Office #: - Option # 8  Fax #: [phone]     Date of Admission: 9/22/2022      Date of Note: 9/28/2022   Reason for Referral: Evaluation and antibiotic management of BL pul nodules, suspected PTB      Current Antimicrobials:    Prior Antimicrobials:      Antibiotic allergy:      Assessment:     Suspected pul TB.   --Numerous bilateral pulmonary nodules which are likely infectious or inflammatory in etiology on CT chest done 9/20/22( no mediastinal/ hilar/axillar LAP noted). Lesions have progressed since last CT 5/20/22 . High on  DDX would be Pul TB/ Miliary TB- other fungal chronic infections Vs non  infectiout etiology such as Connetive tissue ds or maliagnancy possible. 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 Quantiferon TB pending  --HIV 1/2/0- Non reactive, Crypto ag neg, Hep B Ag-neg, hep C ab-- negative  --HB A1C= 5.2%  Weight loss and Fatigue  CTA chest /ABD/Pelvis-- 9/23--pul nodules, chronic liver disease, cholelithiasis   Elevated ER/CRP- 61/5.2 respectively  Morbidly Obese  History of COVID 19 Infection X2. 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 Will check on labs at [facility] lab as well.   Patient aware no emperic treatment for Mycobacterium at this time  FU with-- Dr Keven Carvajal-- as directed by [facility]    Subjective:     No new issue/complaint  Awaiting procedure  Labs- so far reviewed    Verified his contact number for contact on DC    Notes/Labs/Imaging reports reviewed    Shortness of breath [R06.02]  History reviewed. No pertinent surgical history. History reviewed. No pertinent family history. Medications reviewed as below:   Current Facility-Administered Medications   Medication Dose Route Frequency Provider Last Rate Last Admin    albuterol (PROVENTIL HFA, VENTOLIN HFA, PROAIR HFA) inhaler 2 Puff  2 Puff Inhalation Q4H PRN Juliana Torres MD        albuterol (PROVENTIL HFA, VENTOLIN HFA, PROAIR HFA) inhaler 2 Puff  2 Puff Inhalation Q4H Juliana Torres MD   2 Puff at 09/28/22 1333     No Known Allergies  Social History     Socioeconomic History    Marital status:      Spouse name: Not on file    Number of children: Not on file    Years of education: Not on file    Highest education level: Not on file   Occupational History    Not on file   Tobacco Use    Smoking status: Never    Smokeless tobacco: Never   Substance and Sexual Activity    Alcohol use: Not on file    Drug use: Not on file    Sexual activity: Not on file   Other Topics Concern    Not on file   Social History Narrative    Not on file     Social Determinants of Health     Financial Resource Strain: Not on file   Food Insecurity: Not on file   Transportation Needs: Not on file   Physical Activity: Not on file   Stress: Not on file   Social Connections: Not on file   Intimate Partner Violence: Not on file   Housing Stability: Not on file        Review of Systems    Negative Unless BOLDED    General: fevers, chills, myalgias, arthralgias, weight loss, malaise, fatigue.   HEENT:  headaches,sinus pain or presure, recent URI, recent dental procedures;  tinnitus, hearing loss , visual changes, catarats, dizziness or blurred vision  PUlMONARY:  cough , shortness of breath, scant sputum production, hx of asthma or COPD. previous treatement for TB or PPD. Objective:      Visit Vitals  /63   Pulse 79   Temp 97.6 °F (36.4 °C)   Resp 18   Ht 5' 7\" (1.702 m)   Wt 113.4 kg (250 lb)   SpO2 98%   BMI 39.16 kg/m²     Temp (24hrs), Av.3 °F (36.8 °C), Min:97.6 °F (36.4 °C), Max:99 °F (37.2 °C)      Lines / Catheters:   PIV    General:   awake alert and oriented- upset   Skin:   no rashes or skin lesions noted on limited exam  Pigmented skin- left leg from DVT   HEENT:  Normocephalic, atraumatic,no scleral icterus . Dentition good. Neck supple, no bruits. Lungs:   non-labored   Heart:  RRR   Abdomen:  soft,, obese. Non-tender   Genitourinary:  deferred   Extremities:   no clubbing, cyanosis; Neurologic:  No gross focal sensory abnormalities; 5/5 muscle strength to upper and lower extremities. Speech appropirate. Cranial nerves intact   Psychiatric:   appropriate and interactive. Results       Procedure Component Value Units Date/Time    AFB CULTURE + SMEAR W/RFLX ID FROM CULTURE [[id number]]     Order Status: Juan Manus [[id number]]     Order Status: Sent Specimen: Bronchial lavage     CULTURE, BODY FLUID Jaja Perez [[id number]]     Order Status: Sent Specimen: Body Fluid from Miscellaneous Fluid     CULTURE, ANAEROBIC [[id number]]     Order Status: Sent Specimen:  Body fld     AFB CULTURE + SMEAR W/RFLX ID FROM CULTURE [[id number]] Collected: 22 0903    Order Status: Completed Specimen: Miscellaneous sample Updated: 22 1636     Source SPUTUM        AFB Specimen processing Concentration     Acid Fast Smear Negative        Comment: (NOTE)  Performed At: [facility] & [address] [id number]  Justin Valdes MD UQ:[id number]          Acid Fast Culture PENDING    ACID FAST SMEAR [[id number]] Collected: 22 1650    Order Status: Canceled     AFB CULTURE + SMEAR W/RFLX ID FROM CULTURE [[id number]] Collected: 22 1650    Order Status: Completed Specimen: Miscellaneous sample Updated: 09/24/22 1636     Source SPUTUM        AFB Specimen processing Concentration     Acid Fast Smear Negative        Comment: (NOTE)  Performed At: [facility]  [employer] [address] [id number]  Brianda Miranda MD Slovak:[id number]          Acid Fast Culture PENDING    RESPIRATORY VIRUS PANEL W/COVID-19, PCR [[id number]] Collected: 09/23/22 1600    Order Status: Canceled Specimen: NASOPHARYNGEAL SWAB     CRYPTOCOCCUS AG W/REFLEX TITER [[id number]] Collected: 09/23/22 0800    Order Status: Completed Specimen: Blood Updated: 09/23/22 1613     Cryptococcus Ag Negative       AFB CULTURE + SMEAR W/RFLX ID FROM CULTURE [[id number]] Collected: 09/23/22 0800    Order Status: Canceled     AFB CULTURE + SMEAR W/RFLX ID FROM CULTURE [[id number]] Collected: 09/23/22 0615    Order Status: Completed Specimen: Miscellaneous sample Updated: 09/26/22 1537     Source SPUTUM        AFB Specimen processing Concentration     Acid Fast Smear Negative        Comment: (NOTE)  Performed At: [facility]  [employer] [id number]  Brianda Miranda MD XM:[id number]          Acid Fast Culture PENDING    AFB CULTURE + SMEAR W/RFLX ID FROM CULTURE [[id number]]     Order Status: Canceled     AFB CULTURE + SMEAR W/RFLX ID FROM CULTURE [[id number]]     Order Status: Canceled     OVA & PARASITES, STOOL [[id number]]     Order Status: Canceled Specimen: Feces from Jaycee Carmel [[id number]] Collected: 09/22/22 2000    Order Status: Canceled Specimen: Serum from Blood     CULTURE, BLOOD [[id number]] Collected: 09/22/22 1445    Order Status: Completed Specimen: Blood Updated: 09/28/22 0814     Special Requests: NO SPECIAL REQUESTS        Culture result: NO GROWTH 6 DAYS       CULTURE, BLOOD 2nd DRAW (required for [facility]) [[id number]] Collected: 09/22/22 1445    Order Status: Completed Specimen: Blood Updated: 09/28/22 0000     Special Requests: NO SPECIAL REQUESTS        Culture result: NO GROWTH 6 DAYS               Labs: Results:   Chemistry Recent Labs     09/28/22  0549 09/27/22  0409 09/26/22  0441   GLU 95 96 92   * 134* 136   K 4.7 4.0 4.4    101 101   CO2 31 28 30   BUN 11 12 13   CREA 0.77 0.82 0.72   CA 9.3 9.2 9.1   AGAP 4 5 5   BUCR 14 15 18   AP 98 89 99   TP 7.3 7.4 7.3   ALB 3.1* 2.9* 3.0*   GLOB 4.2* 4.5* 4.3*   AGRAT 0.7* 0.6* 0.7*      CBC w/Diff Recent Labs     09/28/22  0549 09/27/22  0409 09/26/22  0441   WBC 5.1 5.7 5.3   RBC 4.42 4.47 4.48   HGB 12.8* 12.1* 12.2*   HCT 38.3 38.6 38.9    158 181   GRANS 56 57 58   LYMPH 20* 20* 20*   EOS 12* 12* 12*            Imaging:      All imaging reviewed from Admission to present as per radiology interpretation in [facility]

## 2022-09-28 NOTE — ROUTINE PROCESS
Discharge instructions reviewed with patient, and opportunity for questions provided. IV discontinued with catheter tip intact. Patient made aware of all follow up visits. Escorted to car in stable condition.

## 2022-09-28 NOTE — PROGRESS NOTES
Pt resting quietly in bed, no distress noted, using Albuterol inhaler at bedside, aware of NPO after midnight for Bronchoscopy in am - no time posted at this time    0455 Bronchoscopy scheduled for 1130 today    0705 Bedside and Verbal shift change report given to Sharron Solis RN (oncoming nurse) by Jack Avilez RN   (offgoing nurse). Report included the following information SBAR, Kardex, Intake/Output, MAR, and Recent Results.

## 2022-09-28 NOTE — PROGRESS NOTES
Pulmonary and Sleep Medicine     Pulmonary Note    Patient: Marjan Vail               Sex: male          DOA: 9/22/2022       YOB: 1955      Age:  75 y.o.        LOS:  LOS: 6 days        Reason for Consult: lung nodules    IMPRESSION:   Multiple pulmonary nodules - R91.8  OSKAR - G47.33  Hx of PE   RECOMMENDATIONS:   Compensated respiratory status; monitor for goal SPO2> 91%  Sputum culture and AFB smear and culture  AFB smearx3 negative safe to do bronch tomorrow  Agree with ID for HIV test, Q TB test, parasite blood exam, echo  I will check MYRNA, ANCA, ACE, RF, CCP  Any indication for bronchoscopy based on clinical course and inability to collect respiratory samples for culture and AFB smear  He is on Coumadin and reversal may be required  May use bronchodilators for wheezing, pulmonary hygiene care  Monitor off of antibiotic  Aspiration prevention bundle, head of the bed at 30' all times  May use CPAP at night to sleep with once AFB negative  Glycemic control  Stress ulcer and DVT prophylaxis  Discussed care plan with patient, Sydney Torres, [place]  Will defer respective systems problem management to primary and other consultant and follow patient with primary and other team  Further recommendations will be based on the patient's response to recommended treatment and results of the investigation ordered. [x]Total care time exclusive of procedures 65 minutes with complex decision making performed and > 50% time spent in face to face consultation. HPI:   Mr. Marjan Vail is a 75 y.o. male who is known to my associate Dr. Lily De León as he came this year in our office for evaluation for exertional dyspnea symptoms. The patient has prior history of pulmonary embolism. He had massive pulmonary embolism in July 2007 following travel history, and was placed on chronic anticoagulation therapy. Patient denies any prior history of asthma or COPD.   He reports prior sleep study being negative for sleep apnea in 2007. Patient has prior COVID infections in the last 2 years-December 2020, and December 2021. Patient also took [address] vaccinations- March 2021, April 2021, and subsequently booster dose in April 2022. Following COVID vaccination booster dose in April 2022, patient developed flu-like symptoms with fever, shortness of breath, fatigue. Subsequently, he has traveled to [facility]. Patient saw his PCP April 2022. Reports of dyspnea on exertion, cough and phlegm, weight loss of 50 lbs since April 2022 and was found to be hypoxemic during walk test with PCP  He was placed on home oxygen therapy. He had CT chest done May 2022 [facility] which was negative for PEs-while noted with multiple lung nodules. Patient on chronic coumadin therapy for history of PEs. Eventually on repeat 6 min walk test during 07/2022 in our office he came off of O2. A follow up CT chest 09/20/22 at [facility] showed worsening in lung nodules leading to recommendation of hospitalization for work up of Tb from Dr. Yissel Thornton. He reports long back PPD that was negative. Wife is asymptomatic. In past several months, he denies any night sweat, fevers, chills, adenopathy, chest pain, hemoptysis, lightheadedness, palpitations, syncope, orthopnea, paroxysmal nocturnal dyspnea. 9/26/2022  Bronchoscopy no endobronchial lesion  Mild inflammation in RLL  BAL . brush   Ok to Dc home today but needs follow up with Dr. Yissel Thornton in 7-10 days   Review of Systems  General ROS: negative for  - fever, chills, malaise  Psychological ROS: negative for - anxiety or depression  Ophthalmic ROS: negative for - eye pain, loss of vision or photophobia  ENT ROS: negative for - epistaxis, headaches, sinus pain or vocal changes  Allergy and Immunology ROS: negative for - hives or postnasal drip  Hematological and Lymphatic ROS: negative for - bleeding problems, blood clots, jaundice, pallor or swollen lymph nodes  Endocrine ROS: negative for - skin changes, temperature intolerance  Cardiovascular ROS: negative for - chest pain, murmur, orthopnea, palpitations or pnd  Gastrointestinal ROS: no nausea, vomiting, abdominal pain, change in bowel habits, or black or bloody stools  Genito-Urinary ROS: no dysuria, trouble voiding, or hematuria  Musculoskeletal ROS: negative for - muscle pain or muscular weakness  Neurological ROS: no TIA or stroke symptoms  Dermatological ROS: negative for - pruritus, rash or skin lesion changes     Past Medical History  Past Medical History:   Diagnosis Date    COVID     Hypoxemia     Pulmonary embolism (HCC)        Past Surgical History  History reviewed. No pertinent surgical history. Family History  History reviewed. No pertinent family history. Social History  Social History     Tobacco Use    Smoking status: Never    Smokeless tobacco: Never        Medications  Current Facility-Administered Medications   Medication Dose Route Frequency    albuterol (PROVENTIL HFA, VENTOLIN HFA, PROAIR HFA) inhaler 2 Puff  2 Puff Inhalation Q4H PRN    albuterol (PROVENTIL HFA, VENTOLIN HFA, PROAIR HFA) inhaler 2 Puff  2 Puff Inhalation Q4H     Prior to Admission medications    Medication Sig Start Date End Date Taking? Authorizing Provider   warfarin (COUMADIN) 4 mg tablet Take 4 mg by mouth every other day. Yes Provider, Historical   warfarin (COUMADIN) 5 mg tablet Take 5 mg by mouth every other day. Yes Provider, Historical       Allergy  No Known Allergies    Physical Exam:   Vital Signs:    Blood pressure 110/63, pulse 79, temperature 97.6 °F (36.4 °C), resp. rate 18, height 5' 7\" (1.702 m), weight 113.4 kg (250 lb), SpO2 98 %. Body mass index is 39.16 kg/m². O2 Device: None (Room air)       Temp (24hrs), Av.3 °F (36.8 °C), Min:97.6 °F (36.4 °C), Max:99 °F (37.2 °C)       Intake/Output:   Last shift:      No intake/output data recorded. Last 3 shifts: No intake/output data recorded.   No intake or output data in the 24 hours ending 09/28/22 1205     General: in no respiratory distress and acyanotic, alert, and oriented times 3, no distress, moderately obese, on room air  HEENT: PERRLA, EOMI, fundi benign, throat normal without erythema or exudate  Neck: No abnormally enlarged lymph nodes or thyroid, supple  Chest: obese chest wall  Lungs: moderate air entry; few rhonchi bl scattered, no wheezes bilaterally, normal percussion bilaterally, no tenderness/ rash; exam limitation secondary to body habitus  Heart: Regular rate and rhythm, S1S2 present, or without murmur or extra heart sounds  Abdomen: obese, bowel sounds normoactive, tympanic, abdomen is soft without significant tenderness, masses, organomegaly or guarding  Extremity: bl pedal edema, no cyanosis, peripheral pulses 2+ and symmetric  Neuro: alert, oriented x 3, no defects noted in general exam.  Skin: Skin color, texture, turgor normal. No rashes or lesions    Labs Reviewed:  Recent Results (from the past 24 hour(s))   PROTHROMBIN TIME + INR    Collection Time: 09/28/22  5:49 AM   Result Value Ref Range    Prothrombin time 20.6 (H) 11.5 - 15.2 sec    INR 1.7 (H) 0.8 - 1.2     CBC WITH AUTOMATED DIFF    Collection Time: 09/28/22  5:49 AM   Result Value Ref Range    WBC 5.1 4.6 - 13.2 K/uL    RBC 4.42 4.35 - 5.65 M/uL    HGB 12.8 (L) 13.0 - 16.0 g/dL    HCT 38.3 36.0 - 48.0 %    MCV 86.7 78.0 - 100.0 FL    MCH 29.0 24.0 - 34.0 PG    MCHC 33.4 31.0 - 37.0 g/dL    RDW 14.2 11.6 - 14.5 %    PLATELET 744 430 - 727 K/uL    MPV 9.7 9.2 - 11.8 FL    NRBC 0.0 0  WBC    ABSOLUTE NRBC 0.00 0.00 - 0.01 K/uL    NEUTROPHILS 56 40 - 73 %    LYMPHOCYTES 20 (L) 21 - 52 %    MONOCYTES 11 (H) 3 - 10 %    EOSINOPHILS 12 (H) 0 - 5 %    BASOPHILS 1 0 - 2 %    IMMATURE GRANULOCYTES 1 (H) 0.0 - 0.5 %    ABS. NEUTROPHILS 2.9 1.8 - 8.0 K/UL    ABS. LYMPHOCYTES 1.0 0.9 - 3.6 K/UL    ABS. MONOCYTES 0.5 0.05 - 1.2 K/UL    ABS. EOSINOPHILS 0.6 (H) 0.0 - 0.4 K/UL    ABS.  BASOPHILS 0.0 0.0 - 0.1 K/UL    ABS. IMM. GRANS. 0.0 0.00 - 0.04 K/UL    DF AUTOMATED     METABOLIC PANEL, COMPREHENSIVE    Collection Time: 09/28/22  5:49 AM   Result Value Ref Range    Sodium 135 (L) 136 - 145 mmol/L    Potassium 4.7 3.5 - 5.5 mmol/L    Chloride 100 100 - 111 mmol/L    CO2 31 21 - 32 mmol/L    Anion gap 4 3.0 - 18 mmol/L    Glucose 95 74 - 99 mg/dL    BUN 11 7.0 - 18 MG/DL    Creatinine 0.77 0.6 - 1.3 MG/DL    BUN/Creatinine ratio 14 12 - 20      GFR est AA >60 >60 ml/min/1.73m2    GFR est non-AA >60 >60 ml/min/1.73m2    Calcium 9.3 8.5 - 10.1 MG/DL    Bilirubin, total 0.7 0.2 - 1.0 MG/DL    ALT (SGPT) 27 16 - 61 U/L    AST (SGOT) 25 10 - 38 U/L    Alk. phosphatase 98 45 - 117 U/L    Protein, total 7.3 6.4 - 8.2 g/dL    Albumin 3.1 (L) 3.4 - 5.0 g/dL    Globulin 4.2 (H) 2.0 - 4.0 g/dL    A-G Ratio 0.7 (L) 0.8 - 1.7             No results for input(s): FIO2I, IFO2, HCO3I, IHCO3, HCOPOC, PCO2I, PCOPOC, IPHI, PHI, PHPOC, PO2I, PO2POC in the last 72 hours. No lab exists for component: IPOC2      All Micro Results       Procedure Component Value Units Date/Time    CULTURE, BLOOD [[id number]] Collected: 09/22/22 1445    Order Status: Completed Specimen: Blood Updated: 09/28/22 0814     Special Requests: NO SPECIAL REQUESTS        Culture result: NO GROWTH 6 DAYS       CULTURE, BLOOD 2nd DRAW (required for [facility]) [[id number]] Collected: 09/22/22 1445    Order Status: Completed Specimen: Blood Updated: 09/28/22 0814     Special Requests: NO SPECIAL REQUESTS        Culture result: NO GROWTH 6 DAYS       AFB CULTURE + SMEAR W/RFLX ID FROM CULTURE [[id number]]     Order Status: [facility] [[id number]]     Order Status: Sent Specimen: Bronchial lavage     CULTURE, BODY FLUID [facility] STAIN [[id number]]     Order Status: Sent Specimen: Body Fluid from Miscellaneous Fluid     CULTURE, ANAEROBIC [[id number]]     Order Status: Sent Specimen:  Body fld     AFB CULTURE + SMEAR W/RFLX ID FROM CULTURE [[id number]] Collected: 09/23/22 0615    Order Status: Completed Specimen: Miscellaneous sample Updated: 09/26/22 1537     Source SPUTUM        AFB Specimen processing Concentration     Acid Fast Smear Negative        Comment: (NOTE)  Performed At: [facility]  [address] [place] [id number]  Frida Miranda MD [id number]          Acid Fast Culture PENDING    AFB CULTURE + SMEAR W/RFLX ID FROM CULTURE [[id number]] Collected: 09/24/22 8119    Order Status: Completed Specimen: Miscellaneous sample Updated: 09/25/22 1636     Source SPUTUM        AFB Specimen processing Concentration     Acid Fast Smear Negative        Comment: (NOTE)  Performed At: [facility]  [address] [place] [id number]  Frida Miranda MD VJ:[id number]          Acid Fast Culture PENDING    AFB CULTURE + SMEAR W/RFLX ID FROM CULTURE [[id number]] Collected: 09/23/22 1650    Order Status: Completed Specimen: Miscellaneous sample Updated: 09/24/22 1636     Source SPUTUM        AFB Specimen processing Concentration     Acid Fast Smear Negative        Comment: (NOTE)  Performed At: [facility]  [address] [place] [id number]  Frida Miranda MD US:[id number]          Acid Fast Culture PENDING    ACID FAST SMEAR [[id number]] Collected: 09/23/22 1650    Order Status: Canceled     CRYPTOCOCCUS AG W/REFLEX TITER [[id number]] Collected: 09/23/22 0800    Order Status: Completed Specimen: Blood Updated: 09/23/22 1613     Cryptococcus Ag Negative       RESPIRATORY VIRUS PANEL W/COVID-19, PCR [[id number]] Collected: 09/23/22 1600    Order Status: Canceled Specimen: NASOPHARYNGEAL SWAB     AFB CULTURE + SMEAR W/RFLX ID FROM CULTURE [[id number]] Collected: 09/23/22 0800    Order Status: Canceled     AFB CULTURE + SMEAR W/RFLX ID FROM CULTURE [[id number]]     Order Status: Canceled     AFB CULTURE + SMEAR W/RFLX ID FROM CULTURE [[id number]]     Order Status: Canceled     OVA & PARASITES, STOOL [[id number]]     Order Status: Canceled Specimen: Feces from Stool     CRYPTOCOCCUS AG W/REFLEX TITER [[id number]] Collected: 09/22/22 2000    Order Status: Canceled Specimen: Serum from Blood                 Imaging:  [x]I have personally reviewed the patients chest radiographs images and report with the patient  Results from Hospital Encounter encounter on 09/22/22    XR CHEST PORT    Narrative  EXAM: XR CHEST PORT    CLINICAL INDICATION/HISTORY: baseline CXR prior to treatment  -Additional: None    COMPARISON: CT 9/23/2022    TECHNIQUE: Frontal view of the chest    _______________    FINDINGS:    HEART AND MEDIASTINUM: The cardiac size is normal. There is tortuosity of the  thoracic aorta. LUNGS AND PLEURAL SPACES: Lungs are mildly underexpanded. There is no focal  pneumonic consolidation. No evidence of pneumothorax or pleural effusion. BONY THORAX AND SOFT TISSUES: No acute osseous abnormality. Monitoring device  projects over the left upper chest wall.    _______________    Impression  Mildly underexpanded lungs without superimposed acute radiographic  cardiopulmonary abnormality. Results from Hospital Encounter encounter on 09/22/22    CTA CHEST ABD PELV W CONT    Narrative  EXAM: CTA of the chest, abdomen, and pelvis    CLINICAL INDICATION/HISTORY: Fever, weight loss, hypoxemia    COMPARISON: Outside chest CT report dated 9/20/2022. Images not available for  comparison. TECHNIQUE: Helical CT imaging of the chest initially performed without  intravenous contrast. Helical CT imaging of the chest, abdomen, and pelvis was  then performed with intravenous contrast. Maximum intensity projections were  generated. One or more dose reduction techniques were used on this CT: automated  exposure control, adjustment of the mAs and/or kVp according to patient size,  and iterative reconstruction techniques. The specific techniques used on this  CT exam have been documented in the patient's electronic medical record.  Digital  Imaging and Communications in Medicine (DICOM) format image data are available  to nonaffiliated external healthcare facilities or entities on a secured, media  free, reciprocally searchable basis with patient authorization for at least a  12-month period after this study. _______________    FINDINGS:    VASCULAR: No intramural hematoma throughout the thoracic aorta on noncontrast  imaging. Mild atherosclerotic calcifications. No aortic aneurysm or dissection. No pulmonary embolism. CHEST:    LUNGS: Numerous bilateral pulmonary nodules identified measuring less than 6 mm  in size. Most of these are in a centrilobular pattern. No dense consolidation. AIRWAY: Scattered secretions in the airway. PLEURA: Normal, with no effusion or pneumothorax. MEDIASTINUM: Normal heart size. No pericardial effusion. LYMPH NODES: Mildly enlarged hilar lymph nodes which are likely reactive. No  bulky lymphadenopathy in the chest.    OTHER: None.    ===============    ABDOMEN/PELVIS:    LIVER, BILIARY: Mild lobular contours of the liver. Subcentimeter hypodensity in  the inferior right hepatic lobe is too small to characterize. No biliary  dilation. Cholelithiasis with mild gallbladder wall thickening. PANCREAS: Unremarkable. SPLEEN: Unremarkable. ADRENALS: Unremarkable. KIDNEYS/URETERS/BLADDER: Peripelvic renal cysts which do not require follow-up  imaging. Left renal calculi. No ureteral calculi or hydronephrosis. No  suspicious renal mass. PELVIC ORGANS: Unremarkable. LYMPH NODES: No enlarged lymph nodes. GASTROINTESTINAL TRACT: No bowel dilation or wall thickening. Colonic  diverticulosis with no evidence of diverticulitis. The appendix is normal.    BONES: No acute or aggressive osseous abnormalities identified. OTHER: Fat-containing umbilical hernia.    _______________    Impression  1. No aortic aneurysm or dissection. No pulmonary embolism.     2. Numerous bilateral pulmonary nodules which are likely infectious or  inflammatory in etiology. Recommend follow-up chest CT in 2-3 months after  treatment to assure resolution. 3. Subtle nodular contours of the liver raising the possibility of chronic liver  disease. 4. Cholelithiasis with diffuse gallbladder wall thickening which could relate to  acute or chronic cholecystitis versus passive congestion.        [x]See my orders for details            Afshin Houston MD

## 2022-09-28 NOTE — PROGRESS NOTES
Hospitalist Progress Note    Patient: Tyra Li MRN: [id number]  CSN: [id number]    YOB: 1955  Age: 75 y.o. 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 Suspected TB -  CTA chest showed no PE, Numerous bilateral pulmonary nodules which are likely infectious or inflammatory in etiology. AFB x 3 stain negative. AFB cultures and quantiferon pending. Pulmonary, ID following  Follow work up  Plan for bronchoscopy per pulmonary. History of DVT/PE -  On anticoagulation with coumadin. Disposition : TBD    Review of systems  General: No fevers or chills. Cardiovascular: No chest pain or pressure. No palpitations. Pulmonary: see above. Gastrointestinal: No nausea, vomiting. Physical Exam:  General: Awake, cooperative, no acute distress    HEENT: NC, Atraumatic. PERRLA, anicteric sclerae. Lungs: CTA Bilaterally. No Wheezing/Rhonchi/Rales. Heart:  S1 S2,  No murmur, No Rubs, No Gallops  Abdomen: Soft, Non distended, Non tender. +Bowel sounds,   Extremities: No c/c/e  Psych:   Not anxious or agitated. Neurologic:  No acute neurological deficit. Vital signs/Intake and Output:  Visit Vitals  /68 (BP 1 Location: Left upper arm, BP Patient Position: At rest)   Pulse 92   Temp 98.5 °F (36.9 °C)   Resp 18   Ht 5' 7\" (1.702 m)   Wt 113.4 kg (250 lb)   SpO2 99%   BMI 39.16 kg/m²     Current Shift:  No intake/output data recorded. Last three shifts:  No intake/output data recorded.             Labs: Results:       Chemistry Recent Labs     09/27/22  0409 09/26/22  8476 09/25/22 0424   GLU 96 92 94   * 136 136   K 4.0 4.4 4.9    101 103   CO2 28 30 31   BUN 12 13 13   CREA 0.82 0.72 0.77   CA 9.2 9.1 9.1   AGAP 5 5 2*   BUCR 15 18 17   AP 89 99 93   TP 7.4 7.3 7.0   ALB 2.9* 3.0* 3.0*   GLOB 4.5* 4.3* 4.0   AGRAT 0.6* 0.7* 0.8      CBC w/Diff Recent Labs     09/27/22 0409 09/26/22 0441 09/25/22 0424   WBC 5.7 5.3 5.4   RBC 4.47 4.48 4.35   HGB 12.1* 12.2* 11.9*   HCT 38.6 38.9 37.8    181 172   GRANS 57 58 58   LYMPH 20* 20* 20*   EOS 12* 12* 12*      Cardiac Enzymes No results for input(s): CPK, CKND1, ARAVIND in the last 72 hours. No lab exists for component: CKRMB, TROIP   Coagulation Recent Labs     09/27/22 0409 09/26/22 0441   PTP 25.2* 27.6*   INR 2.2* 2.5*       Lipid Panel No results found for: CHOL, CHOLPOCT, CHOLX, CHLST, CHOLV, 754460, HDL, HDLP, LDL, LDLC, DLDLP, 796464, VLDLC, VLDL, TGLX, TRIGL, TRIGP, TGLPOCT, CHHD, CHHDX   BNP No results for input(s): BNPP in the last 72 hours.    Liver Enzymes Recent Labs     09/27/22 0409   TP 7.4   ALB 2.9*   AP 89      Thyroid Studies Lab Results   Component Value Date/Time    TSH 0.67 09/24/2022 05:25 AM        Procedures/imaging: see electronic medical records for all procedures/Xrays and details which were not copied into this note but were reviewed prior to creation of Plan

## 2022-09-28 NOTE — DISCHARGE SUMMARY
Discharge Summary    Patient: Caity Lyon MRN: [id number]  CSN: [id number]    YOB: 1955  Age: 75 y.o. Sex: male    DOA: 9/22/2022 LOS:  LOS: 6 days   Discharge Date:      Primary Care Provider:  Merlinda Pickett, MD    Admission Diagnoses: Shortness of breath [R06.02]    Discharge Diagnoses:    Problem List as of 9/28/2022 Date Reviewed: 9/23/2022            Codes Class Noted - Resolved    Abnormal CT of the chest ICD-10-CM: R93.89  ICD-9-CM: 793.2  9/28/2022 - Present        Hilar adenopathy ICD-10-CM: R59.0  ICD-9-CM: 785.6  9/23/2022 - Present        Cough ICD-10-CM: R05.9  ICD-9-CM: 786.2  9/23/2022 - Present        * (Principal) Shortness of breath ICD-10-CM: R06.02  ICD-9-CM: 786.05  9/22/2022 - Present           Discharge Medications:     Current Discharge Medication List        CONTINUE these medications which have NOT CHANGED    Details   !! warfarin (COUMADIN) 4 mg tablet Take 4 mg by mouth every other day. !! warfarin (COUMADIN) 5 mg tablet Take 5 mg by mouth every other day. !! - Potential duplicate medications found. Please discuss with provider. Discharge Condition: Good    Procedures : bronchoscopy    Consults: Infectious Disease and Pulmonary/Critical Care      PHYSICAL EXAM   Visit Vitals  /71   Pulse 89   Temp 97.7 °F (36.5 °C)   Resp 18   Ht 5' 7\" (1.702 m)   Wt 113.4 kg (250 lb)   SpO2 100%   BMI 39.16 kg/m²     General: Awake, cooperative, no acute distress    HEENT: NC, Atraumatic. PERRLA, EOMI. Anicteric sclerae. Lungs:  CTA Bilaterally. No Wheezing/Rhonchi/Rales. Heart:  Regular  rhythm,  No murmur, No Rubs, No Gallops  Abdomen: Soft, Non distended, Non tender. +Bowel sounds,   Extremities: No c/c/e  Psych:   Not anxious or agitated. Neurologic:  No acute neurological deficits.                                      Admission HPI :   Caity Lyon is a 75 y.o.  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 He has been feeling ill with cough shortness of breath intermittently fevers since discharge OT [facility] in March he has undergone several rounds of antibiotics and finally no longer has fevers but notes persistent shortness of breath. Patient also required oxygen and recently was able to come off of this. Within the last few weeks. However he notes he has about 40 pound weight loss recurrent sweats dyspnea with minimal exertion. He has a loop recorder placed for cardiac work-up. He has had several GI scans some which have showed splenomegaly and most of his labs show a persistent eosinophilia. He has lost about 40 pounds unintentionally since he is traveled to [facility] none of his other companions to travel have been ill or had TB  He lives with his wife who has no respiratory symptoms. He would like to go to. Patient has a home Coumadin machine which keeps his INR right about 2.5  He has been on this for several years for PE. 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 Hospital Course :   Mr. Denae Avalos was admitted to droplet isolation room. He was seen and followed by ID and pulmonary. Suspected TB -  CTA chest showed no PE, Numerous bilateral pulmonary nodules which are likely infectious or inflammatory in etiology. AFB x 3 stain negative. AFB cultures and quantiferon pending. S/p bronchoscopy. Follow up cultures and other work up. Ok from ID standpoint to discharge and he will follow up as outpatient. History of DVT/PE -  Continued on anticoagulation with coumadin. Was held for bronchoscopy, restarted post procedure. He is eager to go home.     Activity: Activity as tolerated    Diet: Regular Diet    Follow-up: PCP, pulmonary, ID    Disposition: home    Minutes spent on discharge: 45       Labs: Results: Chemistry Recent Labs     09/28/22  0549 09/27/22  0409 09/26/22  0441   GLU 95 96 92   * 134* 136   K 4.7 4.0 4.4    101 101   CO2 31 28 30   BUN 11 12 13   CREA 0.77 0.82 0.72   CA 9.3 9.2 9.1   AGAP 4 5 5   BUCR 14 15 18   AP 98 89 99   TP 7.3 7.4 7.3   ALB 3.1* 2.9* 3.0*   GLOB 4.2* 4.5* 4.3*   AGRAT 0.7* 0.6* 0.7*      CBC w/Diff Recent Labs     09/28/22  0549 09/27/22 0409 09/26/22 0441   WBC 5.1 5.7 5.3   RBC 4.42 4.47 4.48   HGB 12.8* 12.1* 12.2*   HCT 38.3 38.6 38.9    158 181   GRANS 56 57 58   LYMPH 20* 20* 20*   EOS 12* 12* 12*      Cardiac Enzymes No results for input(s): CPK, CKND1, ARAVIND in the last 72 hours. No lab exists for component: CKRMB, TROIP   Coagulation Recent Labs     09/28/22 0549 09/27/22  0409   PTP 20.6* 25.2*   INR 1.7* 2.2*       Lipid Panel No results found for: CHOL, CHOLPOCT, CHOLX, CHLST, CHOLV, 277053, HDL, HDLP, LDL, LDLC, DLDLP, 419197, VLDLC, VLDL, TGLX, TRIGL, TRIGP, TGLPOCT, CHHD, CHHDX   BNP No results for input(s): BNPP in the last 72 hours. Liver Enzymes Recent Labs     09/28/22  0549   TP 7.3   ALB 3.1*   AP 98      Thyroid Studies Lab Results   Component Value Date/Time    TSH 0.67 09/24/2022 05:25 AM            Significant Diagnostic Studies: US ABD COMP    Result Date: 9/25/2022  EXAM: COMPLETE ABDOMINAL ULTRASOUND CLINICAL INDICATION/HISTORY: Nodular liver   > Additional: None COMPARISON: None TECHNIQUE: Real-time sonography in multiple planes of the abdomen was performed with image documentation. Grayscale, color flow Doppler imaging, and velocity spectral waveform analysis of the portal vein was performed (duplex imaging). _______________ FINDINGS: LIVER:  Hepatic echotexture is coarse and echogenic. No solid mass. Color flow Doppler and velocity spectral waveform analysis of the portal vein shows normal (hepatopetal) direction of flow. BILIARY SYSTEM:  No ductal dilation.  Common bile duct measures 4 GALLBLADDER:  Gallstones are present. Sonographic Johnston's sign is negative per sonographer. RIGHT KIDNEY: 12 cm. No hydronephrosis or solid renal mass. No visible calculi. Renal cortical echogenicity is increased and there is cortical thinning. LEFT KIDNEY:  13 cm. No hydronephrosis or solid renal mass. No visible calculi. Renal cortical echogenicity is increased and there is cortical thinning. PANCREAS:   Not visualized due to overlying bowel gas. SPLEEN: Unremarkable. AORTA: Visualized portions unremarkable. IVC: Visualized portions unremarkable. OTHER: None. _______________     1. No significant findings 2. Appearance of the liver can be seen with hepatic steatosis, cirrhosis, or hepatitis    XR CHEST PORT    Result Date: 9/28/2022  EXAM: XR CHEST PORT CLINICAL INDICATION/HISTORY: s/p brochoscopy, r/o pneumothorax -Additional: None COMPARISON: 9/23/2022 TECHNIQUE: Frontal view of the chest _______________ FINDINGS: HEART AND MEDIASTINUM: Left chest wall device. Normal cardiac size and mediastinal contours. LUNGS AND PLEURAL SPACES: No focal pneumonic consolidation, pneumothorax, or pleural effusion. BONY THORAX AND SOFT TISSUES: No acute osseous abnormality _______________     No pneumothorax status post bronchoscopy. XR CHEST PORT    Result Date: 9/23/2022  EXAM: XR CHEST PORT CLINICAL INDICATION/HISTORY: baseline CXR prior to treatment -Additional: None COMPARISON: CT 9/23/2022 TECHNIQUE: Frontal view of the chest _______________ FINDINGS: HEART AND MEDIASTINUM: The cardiac size is normal. There is tortuosity of the thoracic aorta. LUNGS AND PLEURAL SPACES: Lungs are mildly underexpanded. There is no focal pneumonic consolidation. No evidence of pneumothorax or pleural effusion. BONY THORAX AND SOFT TISSUES: No acute osseous abnormality. Monitoring device projects over the left upper chest wall. _______________     Mildly underexpanded lungs without superimposed acute radiographic cardiopulmonary abnormality.      CTA CHEST ABD PELV W CONT    Result Date: 9/23/2022  EXAM: CTA of the chest, abdomen, and pelvis CLINICAL INDICATION/HISTORY: Fever, weight loss, hypoxemia COMPARISON: Outside chest CT report dated 9/20/2022. Images not available for comparison. TECHNIQUE: Helical CT imaging of the chest initially performed without intravenous contrast. Helical CT imaging of the chest, abdomen, and pelvis was then performed with intravenous contrast. Maximum intensity projections were generated. One or more dose reduction techniques were used on this CT: automated exposure control, adjustment of the mAs and/or kVp according to patient size, and iterative reconstruction techniques. The specific techniques used on this CT exam have been documented in the patient's electronic medical record. Digital Imaging and Communications in Medicine (DICOM) format image data are available to nonaffiliated external healthcare facilities or entities on a secured, media free, reciprocally searchable basis with patient authorization for at least a 12-month period after this study. _______________ FINDINGS: VASCULAR: No intramural hematoma throughout the thoracic aorta on noncontrast imaging. Mild atherosclerotic calcifications. No aortic aneurysm or dissection. No pulmonary embolism. CHEST: LUNGS: Numerous bilateral pulmonary nodules identified measuring less than 6 mm in size. Most of these are in a centrilobular pattern. No dense consolidation. AIRWAY: Scattered secretions in the airway. PLEURA: Normal, with no effusion or pneumothorax. MEDIASTINUM: Normal heart size. No pericardial effusion. LYMPH NODES: Mildly enlarged hilar lymph nodes which are likely reactive. No bulky lymphadenopathy in the chest. OTHER: None. =============== ABDOMEN/PELVIS: LIVER, BILIARY: Mild lobular contours of the liver. Subcentimeter hypodensity in the inferior right hepatic lobe is too small to characterize. No biliary dilation. Cholelithiasis with mild gallbladder wall thickening. PANCREAS: Unremarkable. SPLEEN: Unremarkable. ADRENALS: Unremarkable. KIDNEYS/URETERS/BLADDER: Peripelvic renal cysts which do not require follow-up imaging. Left renal calculi. No ureteral calculi or hydronephrosis. No suspicious renal mass. PELVIC ORGANS: Unremarkable. LYMPH NODES: No enlarged lymph nodes. GASTROINTESTINAL TRACT: No bowel dilation or wall thickening. Colonic diverticulosis with no evidence of diverticulitis. The appendix is normal. BONES: No acute or aggressive osseous abnormalities identified. OTHER: Fat-containing umbilical hernia. _______________     1. No aortic aneurysm or dissection. No pulmonary embolism. 2. Numerous bilateral pulmonary nodules which are likely infectious or inflammatory in etiology. Recommend follow-up chest CT in 2-3 months after treatment to assure resolution. 3. Subtle nodular contours of the liver raising the possibility of chronic liver disease. 4. Cholelithiasis with diffuse gallbladder wall thickening which could relate to acute or chronic cholecystitis versus passive congestion. ECHO ADULT COMPLETE    Result Date: 9/23/2022    Left Ventricle: Normal left ventricular systolic function with a visually estimated EF of 55 - 60%. Left ventricle size is normal. Normal wall thickness. Normal wall motion. Grade I diastolic dysfunction present with normal LV EF. Estimated PASP 26 mm hg         No results found for this or any previous visit. Please note that this dictation was completed with [employer], the [employer] voice recognition software. Quite often unanticipated grammatical, syntax, homophones, and other interpretive errors are inadvertently transcribed by the computer software. Please disregard these errors. Please excuse any errors that have escaped final proofreading.      CC: Ravi Reynoso MD

## 2022-09-28 NOTE — ANESTHESIA POSTPROCEDURE EVALUATION
Procedure(s):  BRONCHOSCOPY, USE TB PRECAUTIONS - PATIENT IS IN ROOM 301. general    Anesthesia Post Evaluation        Patient location during evaluation: PACU  Patient participation: complete - patient participated  Level of consciousness: awake and alert  Pain score: 0  Pain management: adequate  Airway patency: patent  Anesthetic complications: no  Cardiovascular status: acceptable  Respiratory status: acceptable  Hydration status: acceptable  Post anesthesia nausea and vomiting:  none  Final Post Anesthesia Temperature Assessment:  Normothermia (36.0-37.5 degrees C)    VSS per flow sheet.

## 2022-09-28 NOTE — PERIOP NOTES
PATIENT TRANSPORTED TO FLOOR BY ME, PATIENT TRANSPORTATION WOULD BE DELAYED.  PATIENT A&OX4, VSS AND HAND OFF COMMUNICATION GIVEN TO KULWANT  REGISTEREDNURSE OF INPATIENT OVERSIGHT, SPEECH CLEAR SKIN W/D, COURT, NO OBSERVED DYSPNEA NONPRODUCTIVE COUGH OBSERVED AT INTERVALS, CALL BELL IN REACH AND SIDE RAILS UP X4, SKID/FALL SOCKS ON FALL RISK BRACELET PLACED, HE TOOK FEW SMALL SIPS OF WATER AT BEDSIDE AND SWALLOWED SAME WITHOUT REPORTED OR OBSERVED DIFFICULTY, NO DYSPNEA OBSERVED PER THIS NURSE

## 2022-09-28 NOTE — PROGRESS NOTES
Noted plan bronchoscopy today. [address] provided paperwork for ID to complete. Once completed, it will have to be faxed to the [address] ([phone] ax). Anticipate tpt will transition home with physician follow up when medically stable. Please continue to encourage ambulation to assist with identifying potential care transition needs. CM to continue to follow and assist.    Care Management Interventions  Mode of Transport at Discharge:  Other (see comment) (Family)  Transition of Care Consult (CM Consult): Discharge Planning  Health Maintenance Reviewed: Yes  Support Systems: Spouse/Significant Other  Confirm Follow Up Transport: Self  The Plan for Transition of Care is Related to the Following Treatment Goals : Home with physician follow up  Discharge Location  Patient Expects to be Discharged to[de-identified] Home with family assistance

## 2022-09-28 NOTE — PROCEDURES
[facility]                  Pulmonary, Critical Care, and Sleep Medicine     Bronchoscopy Report    Procedure: Diagnostic bronchoscopy. Indication: Abnormal chest imaging    Consent/Treatment: Informed consent was obtained from the  patient after risks, benefits and alternatives were explained. Timeout verified the correct patient and correct procedure. Anesthesia:   General anesthesia was performed by anesthesiology    Procedure Details:   -- The bronchoscope was introduced orally with use of a bite block. -- The trachea and amparo were completely inspected and were found to be normal.  -- The right-sided endobronchial anatomy was completely inspected and was found to be normal.  RLL moderate clear secretions and mild inflmamtion  -- The left-sided endobronchial anatomy was completely inspected and was found to be normal.     Specimens:   Bronchial washings were sent for  AFB smear and culture, micro and cytology , afb    AFB smear and culture. -- Cytology brushings from RLL were sent. -- Protected specimen brush from RLL sent for microbiology. -- Bronchial washings from jourdan were sent for  microbiology. afb fungal  -- The bronchoscope was wedged in the BAL and bronchoalveolar lavage was performed; material was sent for  AFB smear and culture. , cyto, micro, funagl, PCP        Complications: none  Vital signs remained stable throughout the procedure. Patient was woken up in endoscopy suite and then transferred to recovery area in a stable condition. Family members were updated by me. Estimated Blood Loss: Minimal    CXR post procedure is pending . PLAN:  Transferred to PACU in stable condition. CXR stat. NPO till 2 hras   Await path results.         Bill Murillo MD  September 28, 2022  3:24 PM

## 2022-09-28 NOTE — ANESTHESIA PREPROCEDURE EVALUATION
Relevant Problems   No relevant active problems       Anesthetic History   No history of anesthetic complications            Review of Systems / Medical History  Patient summary reviewed, nursing notes reviewed and pertinent labs reviewed    Pulmonary          Shortness of breath      Comments: Pulmonary Nodules   Neuro/Psych   Within defined limits           Cardiovascular                  Exercise tolerance: >4 METS  Comments: Hx Large PE   GI/Hepatic/Renal  Within defined limits              Endo/Other        Morbid obesity     Other Findings              Physical Exam    Airway  Mallampati: II  TM Distance: > 6 cm  Neck ROM: normal range of motion   Mouth opening: Normal     Cardiovascular    Rhythm: regular  Rate: normal         Dental  No notable dental hx       Pulmonary  Breath sounds clear to auscultation               Abdominal         Other Findings            Anesthetic Plan    ASA: 3  Anesthesia type: general          Induction: Intravenous  Anesthetic plan and risks discussed with: Patient

## 2022-09-28 NOTE — PROGRESS NOTES
Hospitalist Progress Note    Patient: Caity Lyon MRN: [id number]  CSN: [id number]    YOB: 1955  Age: 75 y.o. Sex: male    DOA: 9/22/2022 LOS:  LOS: 6 days                Assessment/Plan     Patient Active Problem List   Diagnosis Code    Shortness of breath R06.02    Hilar adenopathy R59.0    Cough R05.9    Abnormal CT of the chest R93.89        Chief complaint :  Possible miliary TB  75 y.o.  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 Suspected TB -  CTA chest showed no PE, Numerous bilateral pulmonary nodules which are likely infectious or inflammatory in etiology. AFB x 3 stain negative. AFB cultures and quantiferon pending. Pulmonary, ID following  Follow work up  Plan for bronchoscopy today. History of DVT/PE -  On anticoagulation with coumadin. Disposition : TBD    Review of systems  General: No fevers or chills. Cardiovascular: No chest pain or pressure. No palpitations. Pulmonary: see above. Gastrointestinal: No nausea, vomiting. Physical Exam:  General: Awake, cooperative, no acute distress    HEENT: NC, Atraumatic. PERRLA, anicteric sclerae. Lungs: CTA Bilaterally. No Wheezing/Rhonchi/Rales. Heart:  S1 S2,  No murmur, No Rubs, No Gallops  Abdomen: Soft, Non distended, Non tender. +Bowel sounds,   Extremities: No c/c/e  Psych:   Not anxious or agitated. Neurologic:  No acute neurological deficit. Vital signs/Intake and Output:  Visit Vitals  /73   Pulse 79   Temp 97.7 °F (36.5 °C)   Resp 18   Ht 5' 7\" (1.702 m)   Wt 113.4 kg (250 lb)   SpO2 94%   BMI 39.16 kg/m²     Current Shift:  No intake/output data recorded. Last three shifts:  No intake/output data recorded.             Labs: Results:       Chemistry Recent Labs     09/28/22  0549 09/27/22  0409 09/26/22  0441   GLU 95 96 92   * 134* 136   K 4.7 4.0 4.4    101 101   CO2 31 28 30   BUN 11 12 13   CREA 0.77 0.82 0.72   CA 9.3 9.2 9.1   AGAP 4 5 5   BUCR 14 15 18   AP 98 89 99   TP 7.3 7.4 7.3   ALB 3.1* 2.9* 3.0*   GLOB 4.2* 4.5* 4.3*   AGRAT 0.7* 0.6* 0.7*      CBC w/Diff Recent Labs     09/28/22  0549 09/27/22  0409 09/26/22  0441   WBC 5.1 5.7 5.3   RBC 4.42 4.47 4.48   HGB 12.8* 12.1* 12.2*   HCT 38.3 38.6 38.9    158 181   GRANS 56 57 58   LYMPH 20* 20* 20*   EOS 12* 12* 12*      Cardiac Enzymes No results for input(s): CPK, CKND1, ARAVIND in the last 72 hours. No lab exists for component: CKRMB, TROIP   Coagulation Recent Labs     09/28/22  0549 09/27/22  0409   PTP 20.6* 25.2*   INR 1.7* 2.2*       Lipid Panel No results found for: CHOL, CHOLPOCT, CHOLX, CHLST, CHOLV, 722691, HDL, HDLP, LDL, LDLC, DLDLP, 203708, VLDLC, VLDL, TGLX, TRIGL, TRIGP, TGLPOCT, CHHD, CHHDX   BNP No results for input(s): BNPP in the last 72 hours.    Liver Enzymes Recent Labs     09/28/22  0549   TP 7.3   ALB 3.1*   AP 98      Thyroid Studies Lab Results   Component Value Date/Time    TSH 0.67 09/24/2022 05:25 AM        Procedures/imaging: see electronic medical records for all procedures/Xrays and details which were not copied into this note but were reviewed prior to creation of Plan

## 2022-09-29 LAB
C-ANCA TITR SER IF: NORMAL TITER
DENV IGG SER IA-ACNC: 1.15 ISR
DENV IGM SER IA-ACNC: <1 ISR
HISTOPLASMA AG, UR,HAGU: <0.5 NG/ML
MYELOPEROXIDASE AB SER IA-ACNC: <0.2 UNITS (ref 0–0.9)
P-ANCA ATYPICAL TITR SER IF: NORMAL TITER
P-ANCA TITR SER IF: NORMAL TITER
PROTEINASE3 AB SER IA-ACNC: <0.2 UNITS (ref 0–0.9)

## 2022-09-29 NOTE — PROGRESS NOTES
Date of note: 9/29/2022     Bronchial lavage stains/ cell count reviewed from Set 28  --no significant finding  --will follow the cultures and PCR orders  Stormy Dr Sachin Bynum assistance with Bronch. Dora Bj Yang MD  [facility]  Office #:     [phone]  [id number] [phone]   Office Fax: [phone]

## 2022-09-30 ENCOUNTER — TELEPHONE (OUTPATIENT)
Dept: INFECTIOUS DISEASES | Age: 67
End: 2022-09-30

## 2022-09-30 DIAGNOSIS — R93.89 ABNORMAL CT OF THE CHEST: Primary | ICD-10-CM

## 2022-09-30 LAB
BACTERIA SPEC CULT: NORMAL
C IMMITIS AB TITR SER CF: NEGATIVE {TITER}
GRAM STN SPEC: NORMAL
GRAM STN SPEC: NORMAL
SERVICE CMNT-IMP: NORMAL

## 2022-09-30 NOTE — TELEPHONE ENCOUNTER
Date of Note 9/30/2022     Returned call to patient, spoke with him and his wife. Finding of BL pulmonary nodules in HIV negative patient with chronic cough and weight loss. He had Bronchoscopy- 9/28    Serology for connective tissue/RA/MYRNA/ sarcoidosis , cryptococcal and histoplasma negative   Cell count with diff, cytology, resp culture--negative   Fungal stain and culture, AFB stain and culture, PCP PCR, viral culture--Pending    Quantiferon TB test negative  GeneXpert MTB PCR--pending  Coccidioidosis serology--Pending    Would be able to advise on travel plans he has once we have some of the stains and PCR result back early next wk. CHEYANNE Rivera [facility] and Health department RN as well. Dee Monsalve MD  [facility])  Office #:     [phone]  [phone] [phone]   Office Fax: [phone]

## 2022-10-01 LAB
ACID FAST STN SPEC: NEGATIVE
ACID FAST STN SPEC: NEGATIVE
MYCOBACTERIUM SPEC QL CULT: NORMAL
MYCOBACTERIUM SPEC QL CULT: NORMAL
SPECIMEN PREPARATION: NORMAL
SPECIMEN PREPARATION: NORMAL
SPECIMEN SOURCE: NORMAL
SPECIMEN SOURCE: NORMAL

## 2022-10-02 LAB
FUNGUS SPEC FUNGUS STN: NORMAL
RESULT 1, 080087: NORMAL
SPECIMEN SOURCE: NORMAL
SPECIMEN SOURCE: NORMAL

## 2022-10-03 ENCOUNTER — TELEPHONE (OUTPATIENT)
Dept: INFECTIOUS DISEASES | Age: 67
End: 2022-10-03

## 2022-10-03 DIAGNOSIS — R93.89 ABNORMAL CT OF THE CHEST: ICD-10-CM

## 2022-10-03 DIAGNOSIS — R05.8 OTHER COUGH: Primary | ICD-10-CM

## 2022-10-03 RX ORDER — LEVOFLOXACIN 750 MG/1
750 TABLET ORAL DAILY
Qty: 7 TABLET | Refills: 0 | Status: SHIPPED | OUTPATIENT
Start: 2022-10-03 | End: 2022-10-10

## 2022-10-03 NOTE — TELEPHONE ENCOUNTER
Date of Note: 10/3/2022     Call placed to patient-- BAL bacterial culture with S. pneumo growth + normal yusuf. He felt sick post procedure with increased cough and some swelling in NL per patient    His BAL reports for PCP PCR, MTB PCR,  fungal and AFB cultures in progress    Plan: Will proceed with a week treatment of Levofloxacin X1 week for the S.pneumo. Explained this is for acute issue post procedure, don' t think it explains his chronic issues with his cough/lung nodules  BAL PCR for MTB -- result this week expected. Patient and wife understand that he needs to monitor his INR closely while on additional ABX    Bia Pendleton MD  [facility])  Office #:     [phone]   Office Fax: [phone]

## 2022-10-05 LAB
BACTERIA SPEC CULT: ABNORMAL
BACTERIA SPEC CULT: ABNORMAL
GRAM STN SPEC: ABNORMAL
GRAM STN SPEC: ABNORMAL
IGA SERPL-MCNC: 297 MG/DL (ref 61–437)
IGE SERPL-ACNC: 6 IU/ML (ref 6–495)
IGG SERPL-MCNC: 1586 MG/DL (ref 603–1613)
IGM SERPL-MCNC: 388 MG/DL (ref 20–172)
P JIROVECII DNA # BAL NAA+PROBE: NEGATIVE COPIES/ML
P JIROVECII DNA # BAL NAA+PROBE: NEGATIVE COPIES/ML
SERVICE CMNT-IMP: ABNORMAL
SOURCE, PJPB1: NORMAL
SOURCE, PJPB1: NORMAL
SPECIMEN SOURCE: NORMAL
SPECIMEN SOURCE: NORMAL

## 2022-10-06 ENCOUNTER — TELEPHONE (OUTPATIENT)
Dept: INFECTIOUS DISEASES | Age: 67
End: 2022-10-06

## 2022-10-06 DIAGNOSIS — R93.89 ABNORMAL CT OF THE CHEST: Primary | ICD-10-CM

## 2022-10-07 LAB
SPECIMEN SOURCE: NORMAL
VIRUS SPEC CULT: NORMAL

## 2022-10-08 LAB
14.3.3 ETA, RHEUM. ARTHRITIS: <0.2 NG/ML
CCP IGA+IGG SERPL IA-ACNC: <20 UNITS
RHEUMATOID FACT SERPL-ACNC: <14 UNITS/ML

## 2022-10-23 PROBLEM — R05.9 COUGH: Status: RESOLVED | Noted: 2022-09-23 | Resolved: 2022-10-23

## 2022-10-24 LAB
BACTERIA SPEC CULT: NORMAL
BACTERIA SPEC CULT: NORMAL
SERVICE CMNT-IMP: NORMAL
SERVICE CMNT-IMP: NORMAL

## 2022-12-01 ENCOUNTER — TELEPHONE (OUTPATIENT)
Dept: INFECTIOUS DISEASES | Age: 67
End: 2022-12-01

## 2023-01-11 ENCOUNTER — HOSPITAL ENCOUNTER (OUTPATIENT)
Dept: WOUND CARE | Age: 68
Discharge: HOME | End: 2023-01-11
Attending: INTERNAL MEDICINE
Payer: MEDICARE

## 2023-01-11 VITALS
OXYGEN SATURATION: 100 % | BODY MASS INDEX: 40.97 KG/M2 | HEART RATE: 79 BPM | WEIGHT: 261.6 LBS | RESPIRATION RATE: 16 BRPM | SYSTOLIC BLOOD PRESSURE: 133 MMHG | TEMPERATURE: 98.3 F | DIASTOLIC BLOOD PRESSURE: 76 MMHG

## 2023-01-11 DIAGNOSIS — R93.89 ABNORMAL CT OF THE CHEST: ICD-10-CM

## 2023-01-11 PROCEDURE — 99211 OFF/OP EST MAY X REQ PHY/QHP: CPT

## 2023-01-11 NOTE — PROGRESS NOTES
[facility]  (A Division of [address] [place] [address])    Teo Rob MD  Office #: - Option # [phone]  Fax #: [phone]     Date of Admission: 1/11/2023      Date of Note: 1/11/2023   Reason for Referral: Evaluation and antibiotic management of BL pul nodules, suspected PTB  Referring MD: Dr Jonatan Hodge      Current Antimicrobials:    Prior Antimicrobials:      Antibiotic allergy:      Assessment:     Pul TB- ruled out- 09/2022  PAVITHRA colonization ? Odd that samples from patient sent to two different labs has different results( [facility] and [facility] in Sept 2022)  --S/P BAL--9/28/22  --Negative AFB culture X3 sputum, X2 BAL. Quantiferon TB negative  -- AFB X3 at [facility] lab-- Negaive for MTB, + for PAVITHRA  --viral/fungus culture negative from BAL  --Post bronchoscopy pneumococcus infection treated with Levofloxacin 9/28/22  --HIV 1/2/0- Non reactive, Crypto ag neg, Hep B Ag-neg, hep C ab-- negative  --HB A1C= 5.2%  Numerous bilateral pulmonary nodules on CT chest done 9/20/22( no mediastinal/ hilar/axillar LAP noted). Lesions have progressed since last CT 5/20/22 . 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 Odd finding of PAVITHRA with [facility] lab on AFB culture but non from deep samples of BAL from [facility].  Will repeat sputum AFB sputum X3  He is scheduled for lung biopsy- with Dr Donnell Felipe-- in Feb 2023. Arranged via [facility] office  --need tissue stain and culture for AFB, fungal, slow growing bacterial pathogens and histology /pathology evaluation indicated  --will DW [facility]  No specific treatment for PAVITHRA at this time. FU in 6-8 weeks with me       Subjective:     Here with wife. Feels ok. Is on low dose Prednisone per [facility], patient states he doesn't see any change in cough or SOB. His appetite , however, has increased and there has been some weight gain. Denies F/C/R/ night sweats. Going about his routine as usual. Doing overseas travel for missionary activity-- will be travelling soon to [place]. R59.0  No past surgical history on file. No family history on file. Medications reviewed as below:   Current Outpatient Medications   Medication Sig Dispense Refill    warfarin (COUMADIN) 4 mg tablet Take 4 mg by mouth every other day. warfarin (COUMADIN) 5 mg tablet Take 5 mg by mouth every other day.      Prednisone 10 mg  daily  Vit D/C/Zinc/B complex    No Known Allergies  Social History     Socioeconomic History    Marital status:      Spouse name: Not on file    Number of children: Not on file    Years of education: Not on file    Highest education level: Not on file   Occupational History    Not on file   Tobacco Use    Smoking status: Never    Smokeless tobacco: Never   Substance and Sexual Activity    Alcohol use: Not on file    Drug use: Not on file    Sexual activity: Not on file   Other Topics Concern    Not on file   Social History Narrative    Not on file     Social Determinants of Health     Financial Resource Strain: Not on file   Food Insecurity: Not on file   Transportation Needs: Not on file   Physical Activity: Not on file   Stress: Not on file   Social Connections: Not on file   Intimate Partner Violence: Not on file   Housing Stability: Not on file        Review of Systems    Negative Unless BOLDED    General: fevers, chills, myalgias, arthralgias, weight loss, malaise, fatigue. HEENT:  headaches,sinus pain or presure, recent URI, recent dental procedures;  tinnitus, hearing loss , visual changes, catarats, dizziness or blurred vision  PUlMONARY:  cough , shortness of breath, scant  to nil sputum production        Objective:      Visit Vitals  /76 (BP 1 Location: Right upper arm)   Pulse 79   Temp 98.3 °F (36.8 °C)   Resp 16   Wt 118.7 kg (261 lb 9.6 oz)   SpO2 100%   BMI 40.97 kg/m²     Temp (24hrs), Av.3 °F (36.8 °C), Min:98.3 °F (36.8 °C), Max:98.3 °F (36.8 °C)      Lines / Catheters:   PIV    General:   WD obese no acute distress. Skin:   no rashes or skin lesions noted on limited exam  Pigmented skin- left leg from DVT   HEENT:  Normocephalic, atraumatic,no scleral icterus . Dentition good. Neck supple, no bruits. Lungs:   non-labored. CTA           Genitourinary:  deferred   Extremities:   no clubbing, cyanosis; Neurologic:  No gross focal sensory abnormalities; . Cranial nerves intact  Ambulates without issue   Psychiatric:   appropriate and interactive. Results       ** No results found for the last 336 hours. **            Labs: Results:   Chemistry No results for input(s): GLU, NA, K, CL, CO2, BUN, CREA, CA, AGAP, BUCR, TBIL, AP, TP, ALB, GLOB, AGRAT in the last 72 hours. No lab exists for component: GPT     CBC w/Diff No results for input(s): WBC, RBC, HGB, HCT, PLT, GRANS, LYMPH, EOS, HGBEXT, HCTEXT, PLTEXT, HGBEXT, HCTEXT, PLTEXT in the last 72 hours. Imaging:      All imaging reviewed from Admission to present as per radiology interpretation in [address] [place] [address]

## 2023-01-16 ENCOUNTER — HOSPITAL ENCOUNTER (OUTPATIENT)
Dept: LAB | Age: 68
Discharge: HOME | End: 2023-01-16
Payer: MEDICARE

## 2023-01-16 PROCEDURE — 87116 MYCOBACTERIA CULTURE: CPT

## 2023-01-17 ENCOUNTER — HOSPITAL ENCOUNTER (OUTPATIENT)
Dept: LAB | Age: 68
Discharge: HOME | End: 2023-01-17
Payer: MEDICARE

## 2023-01-17 PROCEDURE — 87116 MYCOBACTERIA CULTURE: CPT

## 2023-01-18 ENCOUNTER — HOSPITAL ENCOUNTER (OUTPATIENT)
Dept: LAB | Age: 68
Discharge: HOME | End: 2023-01-18
Payer: MEDICARE

## 2023-01-18 LAB
ACID FAST STN SPEC: NEGATIVE
MYCOBACTERIUM SPEC QL CULT: NORMAL
SPECIMEN PREPARATION: NORMAL
SPECIMEN SOURCE: NORMAL

## 2023-01-18 PROCEDURE — 87116 MYCOBACTERIA CULTURE: CPT

## 2023-01-19 LAB
ACID FAST STN SPEC: NEGATIVE
MYCOBACTERIUM SPEC QL CULT: NEGATIVE
MYCOBACTERIUM SPEC QL CULT: NORMAL
MYCOBACTERIUM SPEC QL CULT: NORMAL
SPECIMEN PREPARATION: NORMAL
SPECIMEN SOURCE: NORMAL

## 2023-03-09 ENCOUNTER — TELEPHONE (OUTPATIENT)
Facility: HOSPITAL | Age: 68
End: 2023-03-09

## 2023-03-09 NOTE — TELEPHONE ENCOUNTER
Received pathology report from [facility]-- lung biopsy 02/28-- showing pul dirofilariasis  No treatment indicated  Further cultures from bacterial/afb/fungal-- not available yet    Called to check and dw patient-- VM left    FU to be scheduled with additional cutlure results become available. Pricilla New MD  Office #:     [phone]   Office Fax: [phone]

## 2023-06-21 ENCOUNTER — HOSPITAL ENCOUNTER (OUTPATIENT)
Facility: HOSPITAL | Age: 68
Discharge: HOME | End: 2023-06-21
Payer: MEDICARE

## 2023-06-21 VITALS
RESPIRATION RATE: 16 BRPM | WEIGHT: 270 LBS | BODY MASS INDEX: 42.29 KG/M2 | TEMPERATURE: 99.6 F | HEART RATE: 80 BPM | SYSTOLIC BLOOD PRESSURE: 120 MMHG | DIASTOLIC BLOOD PRESSURE: 75 MMHG

## 2023-06-21 DIAGNOSIS — G47.33 OSA ON CPAP: ICD-10-CM

## 2023-06-21 DIAGNOSIS — B74.8: ICD-10-CM

## 2023-06-21 DIAGNOSIS — R91.8 MULTIPLE PULMONARY NODULES: ICD-10-CM

## 2023-06-21 DIAGNOSIS — Z99.89 OSA ON CPAP: ICD-10-CM

## 2023-06-21 DIAGNOSIS — I82.509 CHRONIC DEEP VEIN THROMBOSIS (DVT) OF LOWER EXTREMITY, UNSPECIFIED LATERALITY, UNSPECIFIED VEIN (HCC): ICD-10-CM

## 2023-06-21 PROBLEM — E66.9 OBESITY: Status: ACTIVE | Noted: 2023-06-21

## 2023-06-21 PROBLEM — R06.02 SHORTNESS OF BREATH: Status: ACTIVE | Noted: 2022-09-22

## 2023-06-21 PROCEDURE — 99211 OFF/OP EST MAY X REQ PHY/QHP: CPT

## 2023-06-21 RX ORDER — DOXYCYCLINE HYCLATE 100 MG
100 TABLET ORAL 2 TIMES DAILY
Qty: 20 TABLET | Refills: 0 | Status: SHIPPED | OUTPATIENT
Start: 2023-06-21 | End: 2023-07-01

## 2023-06-21 NOTE — PROGRESS NOTES
[facility]   (A Division of [address] [place] [address])      Da Kothari MD   Office #: - Option # [phone]   Fax #: [phone]       Date of Clinic Follow up: 6/21/2023     Reason for Referral: Evaluation and antibiotic management of BL pul nodules, suspected PTB   Referring MD: Dr Morgan Garrison         Current Antimicrobials:    Prior Antimicrobials:        None  Levofloxacin         Antibiotic allergy: None            Assessment:        Pulmonary miliary nodules -Bilateral. 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 Lesions have progressed  since last CT 5/20/22 06/2023: CT Follow up    1. Increase in innumerable tiny pulmonary nodules in a miliary pattern. This finding is compatible with an infectious or inflammatory etiology. Tuberculosis and atypical mycobacterium are included in the differential diagnosis.  Hypersensitivity pneumonitis may also be considered, however, there are no significant groundglass

## 2023-07-03 ENCOUNTER — HOSPITAL ENCOUNTER (OUTPATIENT)
Facility: HOSPITAL | Age: 68
Discharge: HOME | End: 2023-07-06
Payer: MEDICARE

## 2023-07-03 LAB
BASOPHILS # BLD: 0.1 K/UL (ref 0–0.1)
BASOPHILS NFR BLD: 1 % (ref 0–2)
CRYPTOC AG SER QL IA: NEGATIVE
DIFFERENTIAL METHOD BLD: ABNORMAL
EOSINOPHIL # BLD: 0.5 K/UL (ref 0–0.4)
EOSINOPHIL NFR BLD: 8 % (ref 0–5)
ERYTHROCYTE [DISTWIDTH] IN BLOOD BY AUTOMATED COUNT: 13.7 % (ref 11.6–14.5)
ERYTHROCYTE [SEDIMENTATION RATE] IN BLOOD: 47 MM/HR (ref 0–20)
HCT VFR BLD AUTO: 42.7 % (ref 36–48)
HGB BLD-MCNC: 13.8 G/DL (ref 13–16)
IMM GRANULOCYTES # BLD AUTO: 0 K/UL (ref 0–0.04)
IMM GRANULOCYTES NFR BLD AUTO: 1 % (ref 0–0.5)
LYMPHOCYTES # BLD: 1.2 K/UL (ref 0.9–3.6)
LYMPHOCYTES NFR BLD: 21 % (ref 21–52)
MCH RBC QN AUTO: 28 PG (ref 24–34)
MCHC RBC AUTO-ENTMCNC: 32.3 G/DL (ref 31–37)
MCV RBC AUTO: 86.6 FL (ref 78–100)
MONOCYTES # BLD: 0.5 K/UL (ref 0.05–1.2)
MONOCYTES NFR BLD: 9 % (ref 3–10)
NEUTS SEG # BLD: 3.5 K/UL (ref 1.8–8)
NEUTS SEG NFR BLD: 60 % (ref 40–73)
NRBC # BLD: 0 K/UL (ref 0–0.01)
NRBC BLD-RTO: 0 PER 100 WBC
PLATELET # BLD AUTO: 185 K/UL (ref 135–420)
PMV BLD AUTO: 9.5 FL (ref 9.2–11.8)
RBC # BLD AUTO: 4.93 M/UL (ref 4.35–5.65)
WBC # BLD AUTO: 5.8 K/UL (ref 4.6–13.2)

## 2023-07-03 PROCEDURE — 86635 COCCIDIOIDES ANTIBODY: CPT

## 2023-07-03 PROCEDURE — 86480 TB TEST CELL IMMUN MEASURE: CPT

## 2023-07-03 PROCEDURE — 82785 ASSAY OF IGE: CPT

## 2023-07-03 PROCEDURE — 86612 BLASTOMYCES ANTIBODY: CPT

## 2023-07-03 PROCEDURE — 85652 RBC SED RATE AUTOMATED: CPT

## 2023-07-03 PROCEDURE — 87327 CRYPTOCOCCUS NEOFORM AG IA: CPT

## 2023-07-03 PROCEDURE — 87385 HISTOPLASMA CAPSUL AG IA: CPT

## 2023-07-03 PROCEDURE — 36415 COLL VENOUS BLD VENIPUNCTURE: CPT

## 2023-07-03 PROCEDURE — 85025 COMPLETE CBC W/AUTO DIFF WBC: CPT

## 2023-07-03 PROCEDURE — 86682 HELMINTH ANTIBODY: CPT

## 2023-07-05 ENCOUNTER — HOSPITAL ENCOUNTER (OUTPATIENT)
Facility: HOSPITAL | Age: 68
Discharge: HOME | End: 2023-07-08
Payer: MEDICARE

## 2023-07-05 LAB — STRONGYLOIDES IGG SER IA-ACNC: NEGATIVE IV

## 2023-07-05 PROCEDURE — 87205 SMEAR GRAM STAIN: CPT

## 2023-07-05 PROCEDURE — 87206 SMEAR FLUORESCENT/ACID STAI: CPT

## 2023-07-05 PROCEDURE — 87070 CULTURE OTHR SPECIMN AEROBIC: CPT

## 2023-07-05 PROCEDURE — 87116 MYCOBACTERIA CULTURE: CPT

## 2023-07-06 ENCOUNTER — HOSPITAL ENCOUNTER (OUTPATIENT)
Facility: HOSPITAL | Age: 68
Discharge: HOME | End: 2023-07-06
Payer: MEDICARE

## 2023-07-06 PROCEDURE — 87205 SMEAR GRAM STAIN: CPT

## 2023-07-06 PROCEDURE — 87116 MYCOBACTERIA CULTURE: CPT

## 2023-07-06 PROCEDURE — 87070 CULTURE OTHR SPECIMN AEROBIC: CPT

## 2023-07-06 PROCEDURE — 87177 OVA AND PARASITES SMEARS: CPT

## 2023-07-06 PROCEDURE — 87206 SMEAR FLUORESCENT/ACID STAI: CPT

## 2023-07-06 PROCEDURE — 87209 SMEAR COMPLEX STAIN: CPT

## 2023-07-07 ENCOUNTER — HOSPITAL ENCOUNTER (OUTPATIENT)
Facility: HOSPITAL | Age: 68
End: 2023-07-07
Payer: MEDICARE

## 2023-07-07 LAB
ACID FAST STN SPEC: NEGATIVE
B DERMAT AB TITR SER: NEGATIVE
BACTERIA SPEC CULT: NORMAL
C IMMITIS AB TITR SER CF: NEGATIVE
GRAM STN SPEC: NORMAL
IGE SERPL-ACNC: 20 IU/ML (ref 6–495)
M TB IFN-G BLD-IMP: NEGATIVE
M TB IFN-G CD4+ T-CELLS BLD-ACNC: 0.04 IU/ML
M TBIFN-G CD4+ CD8+T-CELLS BLD-ACNC: 0.04 IU/ML
MYCOBACTERIUM SPEC QL CULT: NORMAL
QUANTIFERON CRITERIA: NORMAL
QUANTIFERON MITOGEN VALUE: >10 IU/ML
QUANTIFERON NIL VALUE: 0.05 IU/ML
SERVICE CMNT-IMP: NORMAL
SPECIMEN PREPARATION: NORMAL
SPECIMEN SOURCE: NORMAL

## 2023-07-07 PROCEDURE — 87206 SMEAR FLUORESCENT/ACID STAI: CPT

## 2023-07-07 PROCEDURE — 87116 MYCOBACTERIA CULTURE: CPT

## 2023-07-08 LAB
BACTERIA SPEC CULT: NORMAL
GRAM STN SPEC: NORMAL
SERVICE CMNT-IMP: NORMAL

## 2023-07-09 LAB
ACID FAST STN SPEC: NEGATIVE
Lab: POSITIVE OD
MYCOBACTERIUM SPEC QL CULT: NORMAL
SPECIMEN PREPARATION: NORMAL
SPECIMEN SOURCE: NORMAL

## 2023-07-10 LAB
O+P SPEC MICRO: NORMAL
O+P STL CONC: NORMAL
SPECIMEN SOURCE: NORMAL

## 2023-07-20 LAB — H CAPSUL AG SER IA-ACNC: <0.5 NG/ML

## 2023-07-27 LAB
Lab: NORMAL
REFERENCE LAB: NORMAL

## 2023-08-11 LAB
ACID FAST STN SPEC: NEGATIVE
M AVIUM CMPLX RRNA SPEC QL PROBE: NEGATIVE
M GORDONAE RRNA SPEC QL PROBE: POSITIVE
M KANSASII RRNA SPEC QL PROBE: ABNORMAL
M TB CMPLX RRNA SPEC QL PROBE: NEGATIVE
MYCOBACTERIUM SPEC QL CULT: POSITIVE
OTHER: ABNORMAL
SPECIMEN PREPARATION: ABNORMAL
SPECIMEN SOURCE: ABNORMAL
SPECIMEN SOURCE: ABNORMAL
SUSCEPT TESTING: ABNORMAL

## 2023-08-15 LAB
M AVIUM CMPLX RRNA SPEC QL PROBE: NEGATIVE
M GORDONAE RRNA SPEC QL PROBE: POSITIVE
M KANSASII RRNA SPEC QL PROBE: ABNORMAL
M TB CMPLX RRNA SPEC QL PROBE: NEGATIVE
OTHER: ABNORMAL
SPECIMEN SOURCE: ABNORMAL
SUSCEPT TESTING: ABNORMAL

## 2023-08-22 LAB
ACID FAST STN SPEC: NEGATIVE
MYCOBACTERIUM SPEC QL CULT: POSITIVE
SPECIMEN PREPARATION: ABNORMAL
SPECIMEN SOURCE: ABNORMAL

## 2023-09-15 LAB
ACID FAST STN SPEC: NEGATIVE
MYCOBACTERIUM SPEC QL CULT: NEGATIVE
SPECIMEN PREPARATION: NORMAL
SPECIMEN SOURCE: NORMAL

## 2024-04-18 NOTE — TELEPHONE ENCOUNTER
FU of AFB sputum cultures from Sept 2022    Sputum AFB X3 negative  AFB culture from BAL-negative    Am told sputum AFB that went to health department has MAC growth- am awaiting results to be faxed to me for review    Bia Saldivar MD  [facility])  Office #:     [phone]  [phone]   Office Fax: [phone]
No

## 2024-08-30 ENCOUNTER — TRANSCRIBE ORDERS (OUTPATIENT)
Facility: HOSPITAL | Age: 69
End: 2024-08-30

## 2024-08-30 DIAGNOSIS — M25.572 LEFT ANKLE PAIN, UNSPECIFIED CHRONICITY: Primary | ICD-10-CM

## (undated) DEVICE — MAJ-1414 SINGLE USE ADPATER BIOPSY VALV: Brand: SINGLE USE ADAPTOR BIOPSY VALVE

## (undated) DEVICE — TRAP MUCUS SPECIMEN 40ML -- MEDICHOICE

## (undated) DEVICE — BRUSH CYTO L150CM CHN L2MM BRIST DIA1.9MM PTFE S STL BULL